# Patient Record
Sex: FEMALE | Race: WHITE | NOT HISPANIC OR LATINO | URBAN - NONMETROPOLITAN AREA
[De-identification: names, ages, dates, MRNs, and addresses within clinical notes are randomized per-mention and may not be internally consistent; named-entity substitution may affect disease eponyms.]

---

## 2021-01-12 ENCOUNTER — OUTSIDE FACILITY SERVICE (OUTPATIENT)
Dept: CARDIOLOGY | Facility: CLINIC | Age: 61
End: 2021-01-12

## 2021-01-12 PROCEDURE — 93010 ELECTROCARDIOGRAM REPORT: CPT | Performed by: INTERNAL MEDICINE

## 2022-03-30 ENCOUNTER — HOSPITAL ENCOUNTER (OUTPATIENT)
Dept: PREADMISSION TESTING | Age: 62
Discharge: HOME | End: 2022-04-03
Payer: OTHER GOVERNMENT

## 2022-03-30 ENCOUNTER — HOSPITAL ENCOUNTER (OUTPATIENT)
Dept: GENERAL RADIOLOGY | Age: 62
Discharge: HOME | End: 2022-03-30
Payer: OTHER GOVERNMENT

## 2022-03-30 VITALS — HEIGHT: 65 IN | BODY MASS INDEX: 45.98 KG/M2 | WEIGHT: 276 LBS

## 2022-03-30 LAB
ABO/RH: NORMAL
ALBUMIN SERPL-MCNC: 4 G/DL (ref 3.5–5.2)
ALP BLD-CCNC: 122 U/L (ref 35–104)
ALT SERPL-CCNC: 17 U/L (ref 5–33)
ANION GAP SERPL CALCULATED.3IONS-SCNC: 12 MMOL/L (ref 7–19)
ANTIBODY SCREEN: NORMAL
APTT: 26.6 SEC (ref 26–36.2)
AST SERPL-CCNC: 17 U/L (ref 5–32)
BACTERIA: NEGATIVE /HPF
BASOPHILS ABSOLUTE: 0.1 K/UL (ref 0–0.2)
BASOPHILS RELATIVE PERCENT: 1.1 % (ref 0–1)
BILIRUB SERPL-MCNC: 0.3 MG/DL (ref 0.2–1.2)
BILIRUBIN URINE: NEGATIVE
BLOOD, URINE: NEGATIVE
BUN BLDV-MCNC: 17 MG/DL (ref 8–23)
CALCIUM SERPL-MCNC: 9.3 MG/DL (ref 8.8–10.2)
CHLORIDE BLD-SCNC: 101 MMOL/L (ref 98–111)
CLARITY: CLEAR
CO2: 27 MMOL/L (ref 22–29)
COLOR: YELLOW
CREAT SERPL-MCNC: 0.8 MG/DL (ref 0.5–0.9)
CRYSTALS, UA: ABNORMAL /HPF
EOSINOPHILS ABSOLUTE: 0.2 K/UL (ref 0–0.6)
EOSINOPHILS RELATIVE PERCENT: 1.7 % (ref 0–5)
EPITHELIAL CELLS, UA: 1 /HPF (ref 0–5)
GFR AFRICAN AMERICAN: >59
GFR NON-AFRICAN AMERICAN: >60
GLUCOSE BLD-MCNC: 84 MG/DL (ref 74–109)
GLUCOSE URINE: NEGATIVE MG/DL
HCT VFR BLD CALC: 42 % (ref 37–47)
HEMOGLOBIN: 12.9 G/DL (ref 12–16)
HYALINE CASTS: 2 /HPF (ref 0–8)
IMMATURE GRANULOCYTES #: 0 K/UL
INR BLD: 0.93 (ref 0.88–1.18)
KETONES, URINE: NEGATIVE MG/DL
LEUKOCYTE ESTERASE, URINE: ABNORMAL
LYMPHOCYTES ABSOLUTE: 2.3 K/UL (ref 1.1–4.5)
LYMPHOCYTES RELATIVE PERCENT: 24.2 % (ref 20–40)
MCH RBC QN AUTO: 26.9 PG (ref 27–31)
MCHC RBC AUTO-ENTMCNC: 30.7 G/DL (ref 33–37)
MCV RBC AUTO: 87.7 FL (ref 81–99)
MONOCYTES ABSOLUTE: 0.6 K/UL (ref 0–0.9)
MONOCYTES RELATIVE PERCENT: 6.1 % (ref 0–10)
MRSA SCREEN RT-PCR: NOT DETECTED
NEUTROPHILS ABSOLUTE: 6.3 K/UL (ref 1.5–7.5)
NEUTROPHILS RELATIVE PERCENT: 66.5 % (ref 50–65)
NITRITE, URINE: NEGATIVE
PDW BLD-RTO: 13.1 % (ref 11.5–14.5)
PH UA: 6.5 (ref 5–8)
PLATELET # BLD: 395 K/UL (ref 130–400)
PMV BLD AUTO: 9.5 FL (ref 9.4–12.3)
POTASSIUM SERPL-SCNC: 4 MMOL/L (ref 3.5–5)
PROTEIN UA: NEGATIVE MG/DL
PROTHROMBIN TIME: 12.7 SEC (ref 12–14.6)
RBC # BLD: 4.79 M/UL (ref 4.2–5.4)
RBC UA: 4 /HPF (ref 0–4)
SODIUM BLD-SCNC: 140 MMOL/L (ref 136–145)
SPECIFIC GRAVITY UA: 1.02 (ref 1–1.03)
TOTAL PROTEIN: 6.8 G/DL (ref 6.6–8.7)
UROBILINOGEN, URINE: 1 E.U./DL
WBC # BLD: 9.4 K/UL (ref 4.8–10.8)
WBC UA: 1 /HPF (ref 0–5)

## 2022-03-30 PROCEDURE — 71046 X-RAY EXAM CHEST 2 VIEWS: CPT

## 2022-03-30 PROCEDURE — 85025 COMPLETE CBC W/AUTO DIFF WBC: CPT

## 2022-03-30 PROCEDURE — 87641 MR-STAPH DNA AMP PROBE: CPT

## 2022-03-30 PROCEDURE — 80053 COMPREHEN METABOLIC PANEL: CPT

## 2022-03-30 PROCEDURE — 86901 BLOOD TYPING SEROLOGIC RH(D): CPT

## 2022-03-30 PROCEDURE — 86850 RBC ANTIBODY SCREEN: CPT

## 2022-03-30 PROCEDURE — 86900 BLOOD TYPING SEROLOGIC ABO: CPT

## 2022-03-30 PROCEDURE — 81001 URINALYSIS AUTO W/SCOPE: CPT

## 2022-03-30 PROCEDURE — 85610 PROTHROMBIN TIME: CPT

## 2022-03-30 PROCEDURE — 85730 THROMBOPLASTIN TIME PARTIAL: CPT

## 2022-03-30 RX ORDER — DEXAMETHASONE SODIUM PHOSPHATE 10 MG/ML
10 INJECTION, SOLUTION INTRAMUSCULAR; INTRAVENOUS ONCE
Status: CANCELLED | OUTPATIENT
Start: 2022-04-13

## 2022-03-30 RX ORDER — ERGOCALCIFEROL 1.25 MG/1
50000 CAPSULE ORAL WEEKLY
COMMUNITY

## 2022-03-30 RX ORDER — DICLOFENAC POTASSIUM 50 MG/1
100 TABLET, FILM COATED ORAL DAILY
Status: ON HOLD | COMMUNITY
End: 2022-04-15 | Stop reason: HOSPADM

## 2022-03-30 RX ORDER — TRAMADOL HYDROCHLORIDE 50 MG/1
100 TABLET ORAL EVERY 6 HOURS PRN
Status: ON HOLD | COMMUNITY
End: 2022-04-15 | Stop reason: HOSPADM

## 2022-03-30 RX ORDER — OLMESARTAN MEDOXOMIL, AMLODIPINE AND HYDROCHLOROTHIAZIDE TABLET 20/5/12.5 MG 20; 5; 12.5 MG/1; MG/1; MG/1
1 TABLET ORAL DAILY
COMMUNITY

## 2022-03-30 RX ORDER — ACETAMINOPHEN 500 MG
1000 TABLET ORAL ONCE
Status: CANCELLED | OUTPATIENT
Start: 2022-04-13

## 2022-03-30 RX ORDER — PREGABALIN 75 MG/1
75 CAPSULE ORAL ONCE
Status: CANCELLED | OUTPATIENT
Start: 2022-04-13

## 2022-03-30 RX ORDER — ZOLPIDEM TARTRATE 10 MG/1
10 TABLET ORAL NIGHTLY PRN
COMMUNITY

## 2022-03-30 RX ORDER — ASPIRIN 81 MG/1
81 TABLET ORAL DAILY
Status: ON HOLD | COMMUNITY
End: 2022-04-15 | Stop reason: HOSPADM

## 2022-03-30 RX ORDER — ESCITALOPRAM OXALATE 20 MG/1
20 TABLET ORAL DAILY
COMMUNITY

## 2022-03-30 RX ORDER — OXYCODONE HCL 10 MG/1
10 TABLET, FILM COATED, EXTENDED RELEASE ORAL ONCE
Status: CANCELLED | OUTPATIENT
Start: 2022-04-13

## 2022-03-30 RX ORDER — LEVOTHYROXINE SODIUM 0.12 MG/1
125 TABLET ORAL DAILY
COMMUNITY

## 2022-04-13 ENCOUNTER — ANESTHESIA (OUTPATIENT)
Dept: OPERATING ROOM | Age: 62
End: 2022-04-13
Payer: OTHER GOVERNMENT

## 2022-04-13 ENCOUNTER — ANESTHESIA EVENT (OUTPATIENT)
Dept: OPERATING ROOM | Age: 62
End: 2022-04-13
Payer: OTHER GOVERNMENT

## 2022-04-13 ENCOUNTER — APPOINTMENT (OUTPATIENT)
Dept: GENERAL RADIOLOGY | Age: 62
End: 2022-04-13
Attending: ORTHOPAEDIC SURGERY
Payer: OTHER GOVERNMENT

## 2022-04-13 ENCOUNTER — HOSPITAL ENCOUNTER (OUTPATIENT)
Age: 62
Setting detail: OBSERVATION
Discharge: HOME HEALTH | End: 2022-04-15
Attending: ORTHOPAEDIC SURGERY | Admitting: ORTHOPAEDIC SURGERY
Payer: OTHER GOVERNMENT

## 2022-04-13 VITALS — TEMPERATURE: 98 F | SYSTOLIC BLOOD PRESSURE: 110 MMHG | DIASTOLIC BLOOD PRESSURE: 54 MMHG | OXYGEN SATURATION: 96 %

## 2022-04-13 DIAGNOSIS — M17.10 ARTHRITIS OF KNEE: Primary | ICD-10-CM

## 2022-04-13 PROCEDURE — 3600000005 HC SURGERY LEVEL 5 BASE: Performed by: ORTHOPAEDIC SURGERY

## 2022-04-13 PROCEDURE — 2580000003 HC RX 258: Performed by: ORTHOPAEDIC SURGERY

## 2022-04-13 PROCEDURE — 73560 X-RAY EXAM OF KNEE 1 OR 2: CPT

## 2022-04-13 PROCEDURE — 6360000002 HC RX W HCPCS: Performed by: ORTHOPAEDIC SURGERY

## 2022-04-13 PROCEDURE — 7100000001 HC PACU RECOVERY - ADDTL 15 MIN: Performed by: ORTHOPAEDIC SURGERY

## 2022-04-13 PROCEDURE — 2500000003 HC RX 250 WO HCPCS: Performed by: NURSE ANESTHETIST, CERTIFIED REGISTERED

## 2022-04-13 PROCEDURE — 6360000002 HC RX W HCPCS: Performed by: ANESTHESIOLOGY

## 2022-04-13 PROCEDURE — 3600000015 HC SURGERY LEVEL 5 ADDTL 15MIN: Performed by: ORTHOPAEDIC SURGERY

## 2022-04-13 PROCEDURE — 6360000002 HC RX W HCPCS: Performed by: NURSE ANESTHETIST, CERTIFIED REGISTERED

## 2022-04-13 PROCEDURE — G0378 HOSPITAL OBSERVATION PER HR: HCPCS

## 2022-04-13 PROCEDURE — 2500000003 HC RX 250 WO HCPCS: Performed by: ORTHOPAEDIC SURGERY

## 2022-04-13 PROCEDURE — 6370000000 HC RX 637 (ALT 250 FOR IP): Performed by: ORTHOPAEDIC SURGERY

## 2022-04-13 PROCEDURE — 3700000001 HC ADD 15 MINUTES (ANESTHESIA): Performed by: ORTHOPAEDIC SURGERY

## 2022-04-13 PROCEDURE — C1776 JOINT DEVICE (IMPLANTABLE): HCPCS | Performed by: ORTHOPAEDIC SURGERY

## 2022-04-13 PROCEDURE — 3700000000 HC ANESTHESIA ATTENDED CARE: Performed by: ORTHOPAEDIC SURGERY

## 2022-04-13 PROCEDURE — 64447 NJX AA&/STRD FEMORAL NRV IMG: CPT | Performed by: NURSE ANESTHETIST, CERTIFIED REGISTERED

## 2022-04-13 PROCEDURE — 7100000000 HC PACU RECOVERY - FIRST 15 MIN: Performed by: ORTHOPAEDIC SURGERY

## 2022-04-13 PROCEDURE — C9290 INJ, BUPIVACAINE LIPOSOME: HCPCS | Performed by: ORTHOPAEDIC SURGERY

## 2022-04-13 PROCEDURE — 2709999900 HC NON-CHARGEABLE SUPPLY: Performed by: ORTHOPAEDIC SURGERY

## 2022-04-13 PROCEDURE — A4217 STERILE WATER/SALINE, 500 ML: HCPCS | Performed by: ORTHOPAEDIC SURGERY

## 2022-04-13 DEVICE — PSN FEM CR POR CCR NRW SZ8 L: Type: IMPLANTABLE DEVICE | Site: KNEE | Status: FUNCTIONAL

## 2022-04-13 DEVICE — IMPLANTABLE DEVICE
Type: IMPLANTABLE DEVICE | Site: KNEE | Status: FUNCTIONAL
Brand: PERSONA® VIVACIT-E®

## 2022-04-13 DEVICE — PSN TIB POR 2 PEG SZ E L: Type: IMPLANTABLE DEVICE | Site: KNEE | Status: FUNCTIONAL

## 2022-04-13 RX ORDER — HYDROMORPHONE HYDROCHLORIDE 1 MG/ML
1 INJECTION, SOLUTION INTRAMUSCULAR; INTRAVENOUS; SUBCUTANEOUS
Status: DISCONTINUED | OUTPATIENT
Start: 2022-04-13 | End: 2022-04-15 | Stop reason: HOSPADM

## 2022-04-13 RX ORDER — PREGABALIN 75 MG/1
75 CAPSULE ORAL ONCE
Status: COMPLETED | OUTPATIENT
Start: 2022-04-13 | End: 2022-04-13

## 2022-04-13 RX ORDER — ZOLPIDEM TARTRATE 5 MG/1
10 TABLET ORAL NIGHTLY PRN
Status: DISCONTINUED | OUTPATIENT
Start: 2022-04-13 | End: 2022-04-15 | Stop reason: HOSPADM

## 2022-04-13 RX ORDER — ROPIVACAINE HYDROCHLORIDE 5 MG/ML
INJECTION, SOLUTION EPIDURAL; INFILTRATION; PERINEURAL
Status: COMPLETED | OUTPATIENT
Start: 2022-04-13 | End: 2022-04-13

## 2022-04-13 RX ORDER — METOCLOPRAMIDE HYDROCHLORIDE 5 MG/ML
10 INJECTION INTRAMUSCULAR; INTRAVENOUS
Status: DISCONTINUED | OUTPATIENT
Start: 2022-04-13 | End: 2022-04-13 | Stop reason: HOSPADM

## 2022-04-13 RX ORDER — ERGOCALCIFEROL 1.25 MG/1
50000 CAPSULE ORAL WEEKLY
Status: DISCONTINUED | OUTPATIENT
Start: 2022-04-14 | End: 2022-04-15 | Stop reason: HOSPADM

## 2022-04-13 RX ORDER — DEXAMETHASONE SODIUM PHOSPHATE 10 MG/ML
10 INJECTION, SOLUTION INTRAMUSCULAR; INTRAVENOUS ONCE
Status: DISCONTINUED | OUTPATIENT
Start: 2022-04-13 | End: 2022-04-13 | Stop reason: HOSPADM

## 2022-04-13 RX ORDER — MIDAZOLAM HYDROCHLORIDE 1 MG/ML
2 INJECTION INTRAMUSCULAR; INTRAVENOUS
Status: COMPLETED | OUTPATIENT
Start: 2022-04-13 | End: 2022-04-13

## 2022-04-13 RX ORDER — DEXAMETHASONE SODIUM PHOSPHATE 4 MG/ML
INJECTION, SOLUTION INTRA-ARTICULAR; INTRALESIONAL; INTRAMUSCULAR; INTRAVENOUS; SOFT TISSUE PRN
Status: DISCONTINUED | OUTPATIENT
Start: 2022-04-13 | End: 2022-04-13 | Stop reason: SDUPTHER

## 2022-04-13 RX ORDER — TRAMADOL HYDROCHLORIDE 50 MG/1
50 TABLET ORAL EVERY 6 HOURS
Status: DISCONTINUED | OUTPATIENT
Start: 2022-04-13 | End: 2022-04-15 | Stop reason: HOSPADM

## 2022-04-13 RX ORDER — 0.9 % SODIUM CHLORIDE 0.9 %
500 INTRAVENOUS SOLUTION INTRAVENOUS PRN
Status: DISCONTINUED | OUTPATIENT
Start: 2022-04-13 | End: 2022-04-15 | Stop reason: HOSPADM

## 2022-04-13 RX ORDER — DIPHENHYDRAMINE HCL 25 MG
25 TABLET ORAL EVERY 6 HOURS PRN
Status: DISCONTINUED | OUTPATIENT
Start: 2022-04-13 | End: 2022-04-15 | Stop reason: HOSPADM

## 2022-04-13 RX ORDER — SODIUM CHLORIDE 9 MG/ML
INJECTION, SOLUTION INTRAVENOUS CONTINUOUS
Status: DISCONTINUED | OUTPATIENT
Start: 2022-04-13 | End: 2022-04-15 | Stop reason: HOSPADM

## 2022-04-13 RX ORDER — SODIUM CHLORIDE 9 MG/ML
25 INJECTION, SOLUTION INTRAVENOUS PRN
Status: DISCONTINUED | OUTPATIENT
Start: 2022-04-13 | End: 2022-04-15 | Stop reason: HOSPADM

## 2022-04-13 RX ORDER — HYDROMORPHONE HYDROCHLORIDE 1 MG/ML
2 INJECTION, SOLUTION INTRAMUSCULAR; INTRAVENOUS; SUBCUTANEOUS
Status: DISCONTINUED | OUTPATIENT
Start: 2022-04-13 | End: 2022-04-15 | Stop reason: HOSPADM

## 2022-04-13 RX ORDER — ONDANSETRON 2 MG/ML
INJECTION INTRAMUSCULAR; INTRAVENOUS PRN
Status: DISCONTINUED | OUTPATIENT
Start: 2022-04-13 | End: 2022-04-13 | Stop reason: SDUPTHER

## 2022-04-13 RX ORDER — ACETAMINOPHEN 500 MG
1000 TABLET ORAL ONCE
Status: COMPLETED | OUTPATIENT
Start: 2022-04-13 | End: 2022-04-13

## 2022-04-13 RX ORDER — PROPOFOL 10 MG/ML
INJECTION, EMULSION INTRAVENOUS CONTINUOUS PRN
Status: DISCONTINUED | OUTPATIENT
Start: 2022-04-13 | End: 2022-04-13 | Stop reason: SDUPTHER

## 2022-04-13 RX ORDER — ESCITALOPRAM OXALATE 10 MG/1
20 TABLET ORAL DAILY
Status: DISCONTINUED | OUTPATIENT
Start: 2022-04-13 | End: 2022-04-15 | Stop reason: HOSPADM

## 2022-04-13 RX ORDER — SODIUM CHLORIDE 0.9 % (FLUSH) 0.9 %
10 SYRINGE (ML) INJECTION PRN
Status: DISCONTINUED | OUTPATIENT
Start: 2022-04-13 | End: 2022-04-15 | Stop reason: HOSPADM

## 2022-04-13 RX ORDER — CLINDAMYCIN PHOSPHATE 900 MG/50ML
900 INJECTION INTRAVENOUS EVERY 8 HOURS
Status: COMPLETED | OUTPATIENT
Start: 2022-04-13 | End: 2022-04-15

## 2022-04-13 RX ORDER — HYDROMORPHONE HYDROCHLORIDE 1 MG/ML
0.5 INJECTION, SOLUTION INTRAMUSCULAR; INTRAVENOUS; SUBCUTANEOUS
Status: DISCONTINUED | OUTPATIENT
Start: 2022-04-13 | End: 2022-04-15 | Stop reason: HOSPADM

## 2022-04-13 RX ORDER — SODIUM CHLORIDE, SODIUM LACTATE, POTASSIUM CHLORIDE, CALCIUM CHLORIDE 600; 310; 30; 20 MG/100ML; MG/100ML; MG/100ML; MG/100ML
INJECTION, SOLUTION INTRAVENOUS CONTINUOUS
Status: DISCONTINUED | OUTPATIENT
Start: 2022-04-13 | End: 2022-04-13

## 2022-04-13 RX ORDER — MORPHINE SULFATE 4 MG/ML
4 INJECTION, SOLUTION INTRAMUSCULAR; INTRAVENOUS EVERY 5 MIN PRN
Status: DISCONTINUED | OUTPATIENT
Start: 2022-04-13 | End: 2022-04-13 | Stop reason: HOSPADM

## 2022-04-13 RX ORDER — MEPERIDINE HYDROCHLORIDE 25 MG/ML
12.5 INJECTION INTRAMUSCULAR; INTRAVENOUS; SUBCUTANEOUS EVERY 5 MIN PRN
Status: DISCONTINUED | OUTPATIENT
Start: 2022-04-13 | End: 2022-04-13 | Stop reason: HOSPADM

## 2022-04-13 RX ORDER — ONDANSETRON 4 MG/1
4 TABLET, ORALLY DISINTEGRATING ORAL EVERY 8 HOURS PRN
Status: DISCONTINUED | OUTPATIENT
Start: 2022-04-13 | End: 2022-04-15 | Stop reason: HOSPADM

## 2022-04-13 RX ORDER — ONDANSETRON 2 MG/ML
4 INJECTION INTRAMUSCULAR; INTRAVENOUS EVERY 6 HOURS PRN
Status: DISCONTINUED | OUTPATIENT
Start: 2022-04-13 | End: 2022-04-15 | Stop reason: HOSPADM

## 2022-04-13 RX ORDER — ROPIVACAINE HYDROCHLORIDE 5 MG/ML
INJECTION, SOLUTION EPIDURAL; INFILTRATION; PERINEURAL
Status: DISPENSED
Start: 2022-04-13 | End: 2022-04-13

## 2022-04-13 RX ORDER — OXYCODONE HYDROCHLORIDE 5 MG/1
10 TABLET ORAL EVERY 4 HOURS PRN
Status: DISCONTINUED | OUTPATIENT
Start: 2022-04-13 | End: 2022-04-15 | Stop reason: HOSPADM

## 2022-04-13 RX ORDER — MORPHINE SULFATE 2 MG/ML
2 INJECTION, SOLUTION INTRAMUSCULAR; INTRAVENOUS EVERY 5 MIN PRN
Status: DISCONTINUED | OUTPATIENT
Start: 2022-04-13 | End: 2022-04-13 | Stop reason: HOSPADM

## 2022-04-13 RX ORDER — OXYCODONE HYDROCHLORIDE 5 MG/1
20 TABLET ORAL EVERY 4 HOURS PRN
Status: DISCONTINUED | OUTPATIENT
Start: 2022-04-13 | End: 2022-04-15 | Stop reason: HOSPADM

## 2022-04-13 RX ORDER — FAMOTIDINE 20 MG/1
20 TABLET, FILM COATED ORAL ONCE
Status: DISCONTINUED | OUTPATIENT
Start: 2022-04-13 | End: 2022-04-13 | Stop reason: HOSPADM

## 2022-04-13 RX ORDER — LEVOTHYROXINE SODIUM 0.12 MG/1
125 TABLET ORAL DAILY
Status: DISCONTINUED | OUTPATIENT
Start: 2022-04-14 | End: 2022-04-15 | Stop reason: HOSPADM

## 2022-04-13 RX ORDER — OXYCODONE HCL 10 MG/1
10 TABLET, FILM COATED, EXTENDED RELEASE ORAL EVERY 12 HOURS SCHEDULED
Status: DISCONTINUED | OUTPATIENT
Start: 2022-04-13 | End: 2022-04-15 | Stop reason: HOSPADM

## 2022-04-13 RX ORDER — SCOLOPAMINE TRANSDERMAL SYSTEM 1 MG/1
1 PATCH, EXTENDED RELEASE TRANSDERMAL
Status: DISCONTINUED | OUTPATIENT
Start: 2022-04-13 | End: 2022-04-13 | Stop reason: HOSPADM

## 2022-04-13 RX ORDER — DIPHENHYDRAMINE HYDROCHLORIDE 50 MG/ML
12.5 INJECTION INTRAMUSCULAR; INTRAVENOUS
Status: DISCONTINUED | OUTPATIENT
Start: 2022-04-13 | End: 2022-04-13 | Stop reason: HOSPADM

## 2022-04-13 RX ORDER — TRANEXAMIC ACID 100 MG/ML
INJECTION, SOLUTION INTRAVENOUS PRN
Status: DISCONTINUED | OUTPATIENT
Start: 2022-04-13 | End: 2022-04-13 | Stop reason: SDUPTHER

## 2022-04-13 RX ORDER — OXYCODONE HCL 10 MG/1
10 TABLET, FILM COATED, EXTENDED RELEASE ORAL ONCE
Status: COMPLETED | OUTPATIENT
Start: 2022-04-13 | End: 2022-04-13

## 2022-04-13 RX ORDER — SODIUM CHLORIDE 0.9 % (FLUSH) 0.9 %
10 SYRINGE (ML) INJECTION EVERY 12 HOURS SCHEDULED
Status: DISCONTINUED | OUTPATIENT
Start: 2022-04-13 | End: 2022-04-15 | Stop reason: HOSPADM

## 2022-04-13 RX ORDER — OXYCODONE HYDROCHLORIDE 5 MG/1
5 TABLET ORAL EVERY 4 HOURS PRN
Status: DISCONTINUED | OUTPATIENT
Start: 2022-04-13 | End: 2022-04-15 | Stop reason: HOSPADM

## 2022-04-13 RX ORDER — BUPIVACAINE HYDROCHLORIDE 7.5 MG/ML
INJECTION, SOLUTION INTRASPINAL PRN
Status: DISCONTINUED | OUTPATIENT
Start: 2022-04-13 | End: 2022-04-13 | Stop reason: SDUPTHER

## 2022-04-13 RX ORDER — ACETAMINOPHEN 325 MG/1
650 TABLET ORAL EVERY 6 HOURS
Status: DISCONTINUED | OUTPATIENT
Start: 2022-04-13 | End: 2022-04-15 | Stop reason: HOSPADM

## 2022-04-13 RX ORDER — EPHEDRINE SULFATE 50 MG/ML
INJECTION, SOLUTION INTRAVENOUS PRN
Status: DISCONTINUED | OUTPATIENT
Start: 2022-04-13 | End: 2022-04-13 | Stop reason: SDUPTHER

## 2022-04-13 RX ORDER — LIDOCAINE HYDROCHLORIDE 10 MG/ML
1 INJECTION, SOLUTION EPIDURAL; INFILTRATION; INTRACAUDAL; PERINEURAL
Status: DISCONTINUED | OUTPATIENT
Start: 2022-04-13 | End: 2022-04-13 | Stop reason: HOSPADM

## 2022-04-13 RX ADMIN — HYDROMORPHONE HYDROCHLORIDE 2 MG: 1 INJECTION, SOLUTION INTRAMUSCULAR; INTRAVENOUS; SUBCUTANEOUS at 14:59

## 2022-04-13 RX ADMIN — CLINDAMYCIN IN 5 PERCENT DEXTROSE 900 MG: 18 INJECTION, SOLUTION INTRAVENOUS at 22:21

## 2022-04-13 RX ADMIN — PHENYLEPHRINE HYDROCHLORIDE 100 MCG: 10 INJECTION INTRAVENOUS at 10:46

## 2022-04-13 RX ADMIN — EPHEDRINE SULFATE 10 MG: 50 INJECTION INTRAMUSCULAR; INTRAVENOUS; SUBCUTANEOUS at 10:28

## 2022-04-13 RX ADMIN — SODIUM CHLORIDE, SODIUM LACTATE, POTASSIUM CHLORIDE, AND CALCIUM CHLORIDE: 600; 310; 30; 20 INJECTION, SOLUTION INTRAVENOUS at 08:17

## 2022-04-13 RX ADMIN — ZOLPIDEM TARTRATE 10 MG: 5 TABLET ORAL at 20:31

## 2022-04-13 RX ADMIN — Medication 1000 MG: at 10:41

## 2022-04-13 RX ADMIN — OXYCODONE HYDROCHLORIDE 10 MG: 10 TABLET, FILM COATED, EXTENDED RELEASE ORAL at 20:28

## 2022-04-13 RX ADMIN — EPHEDRINE SULFATE 10 MG: 50 INJECTION INTRAMUSCULAR; INTRAVENOUS; SUBCUTANEOUS at 10:44

## 2022-04-13 RX ADMIN — MIDAZOLAM 2 MG: 1 INJECTION, SOLUTION INTRAMUSCULAR; INTRAVENOUS at 09:28

## 2022-04-13 RX ADMIN — TRANEXAMIC ACID 1000 MG: 1 INJECTION, SOLUTION INTRAVENOUS at 11:26

## 2022-04-13 RX ADMIN — EPHEDRINE SULFATE 10 MG: 50 INJECTION INTRAMUSCULAR; INTRAVENOUS; SUBCUTANEOUS at 11:17

## 2022-04-13 RX ADMIN — HYDROMORPHONE HYDROCHLORIDE 1 MG: 1 INJECTION, SOLUTION INTRAMUSCULAR; INTRAVENOUS; SUBCUTANEOUS at 22:19

## 2022-04-13 RX ADMIN — CLINDAMYCIN IN 5 PERCENT DEXTROSE 900 MG: 18 INJECTION, SOLUTION INTRAVENOUS at 15:17

## 2022-04-13 RX ADMIN — DEXAMETHASONE SODIUM PHOSPHATE 4 MG: 4 INJECTION, SOLUTION INTRAMUSCULAR; INTRAVENOUS at 10:29

## 2022-04-13 RX ADMIN — BISACODYL 5 MG: 5 TABLET, COATED ORAL at 15:01

## 2022-04-13 RX ADMIN — TRAMADOL HYDROCHLORIDE 50 MG: 50 TABLET, COATED ORAL at 15:01

## 2022-04-13 RX ADMIN — TRANEXAMIC ACID 1000 MG: 1 INJECTION, SOLUTION INTRAVENOUS at 10:20

## 2022-04-13 RX ADMIN — ONDANSETRON 4 MG: 2 INJECTION INTRAMUSCULAR; INTRAVENOUS at 10:05

## 2022-04-13 RX ADMIN — OXYCODONE 10 MG: 5 TABLET ORAL at 21:37

## 2022-04-13 RX ADMIN — PHENYLEPHRINE HYDROCHLORIDE 100 MCG: 10 INJECTION INTRAVENOUS at 11:10

## 2022-04-13 RX ADMIN — ACETAMINOPHEN 1000 MG: 500 TABLET ORAL at 08:16

## 2022-04-13 RX ADMIN — PREGABALIN 75 MG: 75 CAPSULE ORAL at 08:16

## 2022-04-13 RX ADMIN — ESCITALOPRAM OXALATE 20 MG: 10 TABLET ORAL at 15:01

## 2022-04-13 RX ADMIN — SODIUM CHLORIDE, SODIUM LACTATE, POTASSIUM CHLORIDE, AND CALCIUM CHLORIDE: 600; 310; 30; 20 INJECTION, SOLUTION INTRAVENOUS at 10:42

## 2022-04-13 RX ADMIN — EPHEDRINE SULFATE 10 MG: 50 INJECTION INTRAMUSCULAR; INTRAVENOUS; SUBCUTANEOUS at 10:33

## 2022-04-13 RX ADMIN — PROPOFOL 140 MCG/KG/MIN: 10 INJECTION, EMULSION INTRAVENOUS at 10:15

## 2022-04-13 RX ADMIN — ACETAMINOPHEN 650 MG: 325 TABLET ORAL at 15:01

## 2022-04-13 RX ADMIN — BUPIVACAINE HYDROCHLORIDE IN DEXTROSE 1.8 ML: 7.5 INJECTION, SOLUTION SUBARACHNOID at 10:11

## 2022-04-13 RX ADMIN — Medication 3000 MG: at 20:53

## 2022-04-13 RX ADMIN — OXYCODONE HYDROCHLORIDE 10 MG: 10 TABLET, FILM COATED, EXTENDED RELEASE ORAL at 08:16

## 2022-04-13 RX ADMIN — Medication 2000 MG: at 10:17

## 2022-04-13 RX ADMIN — ACETAMINOPHEN 650 MG: 325 TABLET ORAL at 20:28

## 2022-04-13 RX ADMIN — ROPIVACAINE HYDROCHLORIDE 20 ML: 5 INJECTION, SOLUTION EPIDURAL; INFILTRATION; PERINEURAL at 09:35

## 2022-04-13 RX ADMIN — SODIUM CHLORIDE: 9 INJECTION, SOLUTION INTRAVENOUS at 15:04

## 2022-04-13 RX ADMIN — RIVAROXABAN 10 MG: 10 TABLET, FILM COATED ORAL at 20:50

## 2022-04-13 RX ADMIN — TRAMADOL HYDROCHLORIDE 50 MG: 50 TABLET, COATED ORAL at 20:28

## 2022-04-13 ASSESSMENT — PAIN SCALES - GENERAL
PAINLEVEL_OUTOF10: 8
PAINLEVEL_OUTOF10: 0
PAINLEVEL_OUTOF10: 6
PAINLEVEL_OUTOF10: 10
PAINLEVEL_OUTOF10: 5

## 2022-04-13 ASSESSMENT — PAIN - FUNCTIONAL ASSESSMENT: PAIN_FUNCTIONAL_ASSESSMENT: 0-10

## 2022-04-13 ASSESSMENT — ENCOUNTER SYMPTOMS: SHORTNESS OF BREATH: 0

## 2022-04-13 ASSESSMENT — LIFESTYLE VARIABLES: SMOKING_STATUS: 0

## 2022-04-13 NOTE — ANESTHESIA PROCEDURE NOTES
Spinal Block    Patient location during procedure: OR  Start time: 4/13/2022 10:11 AM  Reason for block: primary anesthetic  Staffing  Performed: resident/CRNA   Resident/CRNA: MICHELLE Beth CRNA  Preanesthetic Checklist  Completed: patient identified, IV checked, site marked, risks and benefits discussed, surgical consent, monitors and equipment checked, pre-op evaluation, timeout performed, anesthesia consent given, oxygen available and patient being monitored  Spinal Block  Patient position: sitting  Prep: Betadine  Patient monitoring: continuous pulse ox and frequent blood pressure checks  Approach: midline  Location: L3/L4  Provider prep: mask and sterile gloves  Local infiltration: lidocaine  Agent: bupivacaine  Dose: 1.8  Dose: 1.8  Needle  Needle type: Pencan   Needle gauge: 24 G  Needle length: 4 in  Lot number: [id number]  Expiration date: 11/1/2023  Assessment  Sensory level: T6  Events: cerebrospinal fluid  Swirl obtained: Yes  CSF: clear  Attempts: 1  Additional Notes  Chart reviewed. Risks and benefits discussed with patient. Sterile prep and drape and sterility maintained throughout procedure. L3-L4 space identified. Lidocaine 1% skin wheal. Pencan 24 gauge needle used. Positive CSF noted. No paresthesia or Heme noted. Positive swirl at beginning of injection and midway. Appropriate sensory level achieved and confirmed by surgeon prior to incision being made.

## 2022-04-13 NOTE — H&P
I have reviewed the history and physical for planned procedure. I have re-examined the patient and there are no changes to the history and physical unless noted below.     Electronically signed by Tressa Palafox MD on 4/13/2022 at 8:59 AM

## 2022-04-13 NOTE — ANESTHESIA PRE PROCEDURE
Department of Anesthesiology  Preprocedure Note       Name:  Rashi Alfaro   Age:  59 y.o.  :  1960                                          MRN:  [id number]         Date:  2022      Surgeon: Janelle Wan):  Juve Posada MD    Procedure: Procedure(s):  LEFT KNEE TOTAL ARTHROPLASTY    Medications prior to admission:   Prior to Admission medications    Medication Sig Start Date End Date Taking? Authorizing Provider   zolpidem (AMBIEN) 10 MG tablet Take 10 mg by mouth nightly as needed for Sleep. Historical Provider, MD   vitamin D (ERGOCALCIFEROL) 1.25 MG (01834 UT) CAPS capsule Take 50,000 Units by mouth once a week Indications: Thursday    Historical Provider, MD   olmesartan-amLODIPine-HCTZ 20-5-12.5 MG TABS Take 1 tablet by mouth daily    Historical Provider, MD   escitalopram (LEXAPRO) 20 MG tablet Take 20 mg by mouth daily    Historical Provider, MD   levothyroxine (SYNTHROID) 125 MCG tablet Take 125 mcg by mouth Daily    Historical Provider, MD   diclofenac (CATAFLAM) 50 MG tablet Take 100 mg by mouth daily    Historical Provider, MD   traMADol (ULTRAM) 50 MG tablet Take 100 mg by mouth every 6 hours as needed for Pain.     Historical Provider, MD   aspirin 81 MG EC tablet Take 81 mg by mouth daily    Historical Provider, MD       Current medications:    Current Facility-Administered Medications   Medication Dose Route Frequency Provider Last Rate Last Admin    acetaminophen (TYLENOL) tablet 1,000 mg  1,000 mg Oral Once Juve Posada MD        ceFAZolin (ANCEF) 2,000 mg in sterile water 20 mL IV syringe  2,000 mg IntraVENous Once Juve Posada MD        dexamethasone (PF) (DECADRON) injection 10 mg  10 mg IntraVENous Once Juve Posada MD        oxyCODONE (OXYCONTIN) extended release tablet 10 mg  10 mg Oral Once Juve Posada MD        pregabalin (LYRICA) capsule 75 mg  75 mg Oral Once Juve Posada MD        lactated ringers infusion   IntraVENous Continuous Juve Posada MD Allergies: Allergies   Allergen Reactions    Sulfa Antibiotics Other (See Comments)     Blisters, chills       Problem List:  There is no problem list on file for this patient. Past Medical History:        Diagnosis Date    Arthritis     Hypertension     PONV (postoperative nausea and vomiting)     Thyroid disease        Past Surgical History:        Procedure Laterality Date    CATARACT REMOVAL Bilateral     CHOLECYSTECTOMY      PLANTAR FASCIA SURGERY Bilateral     THYROIDECTOMY N/A     partial    TUBAL LIGATION         Social History:    Social History     Tobacco Use    Smoking status: Never Smoker    Smokeless tobacco: Never Used   Substance Use Topics    Alcohol use: Not on file                                Counseling given: Not Answered      Vital Signs (Current):   Vitals:    04/13/22 0809   BP: (!) 142/76   Pulse: 71   Resp: 16   Temp: 97.3 °F (36.3 °C)   TempSrc: Tympanic   SpO2: 97%   Weight: 276 lb (125.2 kg)   Height: 5' 5\" (1.651 m)                                              BP Readings from Last 3 Encounters:   04/13/22 (!) 142/76       NPO Status: Time of last liquid consumption: 2330                        Time of last solid consumption: 1730                        Date of last liquid consumption: 04/12/22                        Date of last solid food consumption: 04/12/22    BMI:   Wt Readings from Last 3 Encounters:   04/13/22 276 lb (125.2 kg)   03/30/22 276 lb (125.2 kg)     Body mass index is 45.93 kg/m².     CBC:   Lab Results   Component Value Date    WBC 9.4 03/30/2022    RBC 4.79 03/30/2022    HGB 12.9 03/30/2022    HCT 42.0 03/30/2022    MCV 87.7 03/30/2022    RDW 13.1 03/30/2022     03/30/2022       CMP:   Lab Results   Component Value Date     03/30/2022    K 4.0 03/30/2022     03/30/2022    CO2 27 03/30/2022    BUN 17 03/30/2022    CREATININE 0.8 03/30/2022    GFRAA >59 03/30/2022    LABGLOM >60 03/30/2022    GLUCOSE 84 03/30/2022    PROT 6.8 03/30/2022    CALCIUM 9.3 03/30/2022    BILITOT 0.3 03/30/2022    ALKPHOS 122 03/30/2022    AST 17 03/30/2022    ALT 17 03/30/2022       POC Tests: No results for input(s): POCGLU, POCNA, POCK, POCCL, POCBUN, POCHEMO, POCHCT in the last 72 hours. Coags:   Lab Results   Component Value Date    PROTIME 12.7 03/30/2022    INR 0.93 03/30/2022    APTT 26.6 03/30/2022       HCG (If Applicable): No results found for: PREGTESTUR, PREGSERUM, HCG, HCGQUANT     ABGs: No results found for: PHART, PO2ART, PGO1ICL, TNH2QRQ, BEART, Z1IUGWOQ     Type & Screen (If Applicable):  No results found for: LABABO, LABRH    Drug/Infectious Status (If Applicable):  No results found for: HIV, HEPCAB    COVID-19 Screening (If Applicable): No results found for: COVID19        Anesthesia Evaluation  Patient summary reviewed and Nursing notes reviewed history of anesthetic complications:   Airway: Mallampati: II  TM distance: >3 FB   Neck ROM: full  Mouth opening: > = 3 FB Dental: normal exam         Pulmonary:Negative Pulmonary ROS and normal exam  breath sounds clear to auscultation      (-) shortness of breath and not a current smoker          Patient did not smoke on day of surgery. Cardiovascular:    (+) hypertension:,     (-) CAD,  angina and  CHF    NYHA Classification: I  ECG reviewed  Rhythm: regular  Rate: normal           Beta Blocker:  Not on Beta Blocker         Neuro/Psych:   Negative Neuro/Psych ROS     (-) seizures, CVA and depression/anxiety            GI/Hepatic/Renal: Neg GI/Hepatic/Renal ROS       (-) hiatal hernia and GERD       Endo/Other: Negative Endo/Other ROS   (+) : arthritis: OA., . Pt had PAT visit. Abdominal:       Abdomen: soft. Vascular: Other Findings:             Anesthesia Plan      spinal and regional     ASA 3     (Adductor Canal block for postop pain management, risk/benefit discussed with patient and accepted.  (risk includes but not limited to; bleeding, infection, risk of drug reaction, nerve damage, death ))  Induction: intravenous. BIS    Anesthetic plan and risks discussed with patient. Plan discussed with CRNA.                   aPvithra Santana MD   4/13/2022

## 2022-04-13 NOTE — BRIEF OP NOTE
Brief Postoperative Note      Patient: Trudi Nguyen  YOB: 1960  MRN: [id number]    Date of Procedure: 4/13/2022    Pre-Op Diagnosis: m17.12    Post-Op Diagnosis: Same       Procedure(s):  LEFT KNEE TOTAL ARTHROPLASTY COMPLEX    Surgeon(s):  Jose Carlos Sung MD    Assistant:  First Assistant: Julia Curtis; Justina Bynum PA-C    Anesthesia: Spinal    Estimated Blood Loss (mL): 251    Complications: None    Specimens:   * No specimens in log *    Implants:  Implant Name Type Inv.  Item Serial No.  Lot No. LRB No. Used Action   PSN FEM CR POR CCR NRW SZ8 L - [id number]  PSN FEM CR POR CCR NRW SZ8 L  [employer] [id number] Left 1 Implanted   PSN TIB POR 2 PEG SZ E L - [id number]  PSN TIB POR 2 PEG SZ E L  [employer]- [id number] Left 1 Implanted   PSN MC VE ASF L 10MM 8-11 EF - [id number]  PSN MC VE ASF L 10MM 8-11 EF  [employer]- [id number] Left 1 Implanted         Drains: * No LDAs found *    Findings: tt: 20 MINUTES    Electronically signed by Jose Carlos Sung MD on 4/13/2022 at 11:40 AM

## 2022-04-13 NOTE — ANESTHESIA PROCEDURE NOTES
Peripheral Block    Patient location during procedure: holding area  Start time: 4/13/2022 9:35 AM  End time: 4/13/2022 9:35 AM  Staffing  Performed: anesthesiologist   Anesthesiologist: Leydi Rowell MD  Preanesthetic Checklist  Completed: patient identified, IV checked, site marked, risks and benefits discussed, surgical consent, monitors and equipment checked, pre-op evaluation, timeout performed, anesthesia consent given, oxygen available and patient being monitored  Peripheral Block  Patient position: supine  Prep: ChloraPrep  Patient monitoring: cardiac monitor, continuous pulse ox, frequent blood pressure checks and IV access  Block type: Femoral  Laterality: left  Injection technique: single-shot  Guidance: ultrasound guided  Adductor canal  Provider prep: mask and sterile gloves  Needle  Needle type: short-bevel   Needle gauge: 20 G  Needle length: 10 cm  Needle localization: ultrasound guidance  Assessment  Injection assessment: negative aspiration for heme, no paresthesia on injection and local visualized surrounding nerve on ultrasound  Paresthesia pain: none  Slow fractionated injection: yes  Hemodynamics: stable  Additional Notes  Needle was introduced in proximal third of thigh in an  china-medial to posterior direction. The needle was visualized \" in- plane\" under ultrasound guidance to access the adductor canal in a sub-sartorial fashion. The femoral artery was avoided and 20 cc of 0.5% ropivacaine  was injected and surrounded the nerve plexus. The plexus appeared anatomically normal, no obvious pathology was noted.  A permanent image was obtained   Medications Administered  Ropivacaine (NAROPIN) injection 0.5%, 20 mL  Reason for block: post-op pain management

## 2022-04-13 NOTE — ANESTHESIA POSTPROCEDURE EVALUATION
Department of Anesthesiology  Postprocedure Note    Patient: Moe Morton  MRN: [id number]  YOB: 1960  Date of evaluation: 4/13/2022  Time:  12:12 PM     Procedure Summary     Date: 04/13/22 Room / Location: [address]    Anesthesia Start: 1004 Anesthesia Stop:     Procedure: LEFT KNEE TOTAL ARTHROPLASTY (Left Knee) Diagnosis: (m17.12)    Surgeons: Florinda Roche MD Responsible Provider: MICHELLE Mooney CRNA    Anesthesia Type: spinal, regional ASA Status: 3          Anesthesia Type: No value filed. Mesfin Phase I: Mesfin Score: 10    Mesfin Phase II:      Last vitals: Reviewed and per EMR flowsheets.        Anesthesia Post Evaluation    Patient location during evaluation: PACU  Patient participation: waiting for patient participation  Level of consciousness: awake  Pain score: 0  Airway patency: patent  Nausea & Vomiting: no nausea and no vomiting  Complications: no  Cardiovascular status: blood pressure returned to baseline  Respiratory status: acceptable  Hydration status: euvolemic  Comments: Report to RN

## 2022-04-13 NOTE — OP NOTE
[facility] SHANEKA [address], [address] [facility]                                OPERATIVE REPORT    PATIENT NAME: Munira Venegas                     :        1960  MED REC NO:   [id number]                              ROOM:       [facility]  ACCOUNT NO:   [de-identified]                           ADMIT DATE: 2022  PROVIDER:     Manuela Grider MD    DATE OF PROCEDURE:  2022    PREOPERATIVE DIAGNOSES:  1. Primary osteoarthritis, left knee. 2.  BMI of _____. POSTOPERATIVE DIAGNOSES:  1. Primary osteoarthritis, left knee. 2.  BMI of _____. PROCEDURE:  Complex primary left total knee replacement. Please note  complexity of the surgery is due to the fact that she had elevated BMI. This added 100% increase in difficulty in exposure, placement of  implants, required use of fluoroscopy for accuracy of component  placement. SURGEON:  Manuela Grider MD    FIRST ASSISTANT:  Michael Bridges PA-C. Please note, he is a critical  assistant in exposure and placement of implants. ANESTHESIA:  Spinal with adductor canal block. EBL:  150 mL. FLUIDS:  1650 mL. URINE OUTPUT:  120 mL. TOURNIQUET TIME:  20 minutes. COMPONENTS USED:  Svitlana Persona knee system, femur size 8 narrow CR TM  press-fit femur, tibia size E TM press-fit tibia, polyethylene size 10  MC polyethylene. INDICATIONS:  This is a 58-year-old lady with severe arthritis of the  left knee, failing conservative care. Because of this, she elected for  the above-stated procedure. OPERATIVE PROCEDURE:  After informed consent, she was given 3 gm of  Ancef, 1 gm of tranexamic acid, underwent adductor canal block and  spinal anesthetic. Tourniquet was placed around the left upper thigh. The right leg was placed in SCD. The left leg was prepped and draped in  the usual sterile fashion. The leg was Esmarched. Tourniquet was  inflated to 250 mmHg. Midline incision was made. Parapatellar approach  was made. Prepatellar fat pad was partially excised. Synovium on the  distal femur was elevated. Intramedullary canal of the femur was  drilled into. Distal resection was made in 3 degrees of valgus taking a  +2 cut. We assessed the resection and did an additional +2 distal  femoral resection. Our final resection medially 11.5 mm, laterally 10  mm. We sized this to a size 8 femur. This was placed in 5 degrees of  external rotation. Anterior, posterior, and chamfer cuts were made. Posteromedial cut 9.75 mm, posterolateral cut 6 mm. The tibia was  exposed. The 7-degree cutting guide was placed in line with the  anterior tibial crest proximally and the middle of the ankle distally,  and tibial resection was made. Menisci excised. Tourniquet released. We were tighter in flexion than extension. We sized the tibia to a size  E tibia which was placed in line with the anterior tibial crest.  Trial  reduction was done. Fluoroscopic views revealed a nice tibial cut,  maybe 1 degree of varus. This was still tight a little bit in flexion  and extension, another +2 tibial resection was made. Repeat trial  reduction was done. We were still tight in flexion, but had excellent  extension gap, so the PCL was released, this nicely balanced the knee. We then drilled our lug holes for the femur and tibia and good bone  quality for press-fit fixation. Lateral facet release was done on the  patella. We mixed 20 mL of Exparel with 30 mL of saline and 50 mL of  0.25% Marcaine. We injected the posteromedial capsule with 40 mL and 60  mL in the subcutaneous tissues. We then irrigated with 1500 mL of  normal saline and dried the bone. The size E TM tibia was press-fit and  had very good fixation followed by size 8 narrow femoral component. Repeat trial reduction was done. We then irrigated with another 1500 mL  of normal saline.   The final size 10 MC liner was locked into the tibial  tray. We had full extension of the knee with perfect midline tracking  of the patella limited only by her thigh touching her calf. Parapatellar approach was closed with interrupted #1 Vicryl suture, 2-0  Vicryl suture for subcutaneous tissues, and 2-0 Vicryl for subcuticular  stitch. Prineo Dermabond and soft dressings were applied. The patient  was taken to the recovery room in stable condition. POSTOPERATIVE PLANS:  1. The patient will be on typical total knee arthroplasty protocol. 2.  She will be on 2 doses of Ancef and 6 doses of clindamycin. 3.  She will be on Xarelto 10 mg a day for 21 days followed by 81 mg of  aspirin twice a day for another 3 weeks. Please note, prior to surgery, her range of motion to the right knee was  8 to 102 degrees.         Pako Blood MD    D: 04/13/2022 12:45:19      T: 04/13/2022 17:34:38     [email or web address]  Job#: [id number]     Doc#: [id number]    CC:

## 2022-04-14 PROBLEM — K59.03 DRUG-INDUCED CONSTIPATION: Status: ACTIVE | Noted: 2022-04-14

## 2022-04-14 PROBLEM — G47.9: Chronic | Status: ACTIVE | Noted: 2022-04-14

## 2022-04-14 PROBLEM — I10 PRIMARY HYPERTENSION: Chronic | Status: ACTIVE | Noted: 2022-04-14

## 2022-04-14 LAB
ANION GAP SERPL CALCULATED.3IONS-SCNC: 10 MMOL/L (ref 7–19)
BUN BLDV-MCNC: 11 MG/DL (ref 8–23)
CALCIUM SERPL-MCNC: 8.8 MG/DL (ref 8.8–10.2)
CHLORIDE BLD-SCNC: 102 MMOL/L (ref 98–111)
CO2: 28 MMOL/L (ref 22–29)
CREAT SERPL-MCNC: 0.6 MG/DL (ref 0.5–0.9)
GFR AFRICAN AMERICAN: >59
GFR NON-AFRICAN AMERICAN: >60
GLUCOSE BLD-MCNC: 119 MG/DL (ref 74–109)
HCT VFR BLD CALC: 32.7 % (ref 37–47)
HEMOGLOBIN: 10.1 G/DL (ref 12–16)
POTASSIUM REFLEX MAGNESIUM: 4.6 MMOL/L (ref 3.5–5)
SODIUM BLD-SCNC: 140 MMOL/L (ref 136–145)

## 2022-04-14 PROCEDURE — 2580000003 HC RX 258: Performed by: ORTHOPAEDIC SURGERY

## 2022-04-14 PROCEDURE — 97161 PT EVAL LOW COMPLEX 20 MIN: CPT

## 2022-04-14 PROCEDURE — 85014 HEMATOCRIT: CPT

## 2022-04-14 PROCEDURE — 85018 HEMOGLOBIN: CPT

## 2022-04-14 PROCEDURE — 80048 BASIC METABOLIC PNL TOTAL CA: CPT

## 2022-04-14 PROCEDURE — G0378 HOSPITAL OBSERVATION PER HR: HCPCS

## 2022-04-14 PROCEDURE — 6370000000 HC RX 637 (ALT 250 FOR IP): Performed by: ORTHOPAEDIC SURGERY

## 2022-04-14 PROCEDURE — 96361 HYDRATE IV INFUSION ADD-ON: CPT

## 2022-04-14 PROCEDURE — 97530 THERAPEUTIC ACTIVITIES: CPT

## 2022-04-14 PROCEDURE — 97116 GAIT TRAINING THERAPY: CPT

## 2022-04-14 PROCEDURE — 6360000002 HC RX W HCPCS: Performed by: ORTHOPAEDIC SURGERY

## 2022-04-14 PROCEDURE — 36415 COLL VENOUS BLD VENIPUNCTURE: CPT

## 2022-04-14 PROCEDURE — 2500000003 HC RX 250 WO HCPCS: Performed by: ORTHOPAEDIC SURGERY

## 2022-04-14 RX ADMIN — CLINDAMYCIN IN 5 PERCENT DEXTROSE 900 MG: 18 INJECTION, SOLUTION INTRAVENOUS at 08:17

## 2022-04-14 RX ADMIN — BISACODYL 5 MG: 5 TABLET, COATED ORAL at 08:15

## 2022-04-14 RX ADMIN — ACETAMINOPHEN 650 MG: 325 TABLET ORAL at 08:15

## 2022-04-14 RX ADMIN — ESCITALOPRAM OXALATE 20 MG: 10 TABLET ORAL at 08:14

## 2022-04-14 RX ADMIN — TRAMADOL HYDROCHLORIDE 50 MG: 50 TABLET, COATED ORAL at 15:38

## 2022-04-14 RX ADMIN — ACETAMINOPHEN 650 MG: 325 TABLET ORAL at 15:39

## 2022-04-14 RX ADMIN — OXYCODONE HYDROCHLORIDE 10 MG: 10 TABLET, FILM COATED, EXTENDED RELEASE ORAL at 20:46

## 2022-04-14 RX ADMIN — Medication 3000 MG: at 03:39

## 2022-04-14 RX ADMIN — OXYCODONE 10 MG: 5 TABLET ORAL at 17:38

## 2022-04-14 RX ADMIN — ERGOCALCIFEROL 50000 UNITS: 1.25 CAPSULE ORAL at 08:14

## 2022-04-14 RX ADMIN — LEVOTHYROXINE SODIUM 125 MCG: 125 TABLET ORAL at 05:31

## 2022-04-14 RX ADMIN — ZOLPIDEM TARTRATE 10 MG: 5 TABLET ORAL at 22:48

## 2022-04-14 RX ADMIN — OXYCODONE 10 MG: 5 TABLET ORAL at 13:20

## 2022-04-14 RX ADMIN — TRAMADOL HYDROCHLORIDE 50 MG: 50 TABLET, COATED ORAL at 08:15

## 2022-04-14 RX ADMIN — CLINDAMYCIN IN 5 PERCENT DEXTROSE 900 MG: 18 INJECTION, SOLUTION INTRAVENOUS at 15:38

## 2022-04-14 RX ADMIN — RIVAROXABAN 10 MG: 10 TABLET, FILM COATED ORAL at 17:38

## 2022-04-14 RX ADMIN — OXYCODONE 10 MG: 5 TABLET ORAL at 08:15

## 2022-04-14 RX ADMIN — ACETAMINOPHEN 650 MG: 325 TABLET ORAL at 03:32

## 2022-04-14 RX ADMIN — ACETAMINOPHEN 650 MG: 325 TABLET ORAL at 20:46

## 2022-04-14 RX ADMIN — OXYCODONE HYDROCHLORIDE 10 MG: 10 TABLET, FILM COATED, EXTENDED RELEASE ORAL at 08:15

## 2022-04-14 RX ADMIN — OXYCODONE 10 MG: 5 TABLET ORAL at 03:32

## 2022-04-14 RX ADMIN — OXYCODONE 20 MG: 5 TABLET ORAL at 22:31

## 2022-04-14 RX ADMIN — CLINDAMYCIN IN 5 PERCENT DEXTROSE 900 MG: 18 INJECTION, SOLUTION INTRAVENOUS at 22:33

## 2022-04-14 RX ADMIN — TRAMADOL HYDROCHLORIDE 50 MG: 50 TABLET, COATED ORAL at 20:46

## 2022-04-14 ASSESSMENT — PAIN DESCRIPTION - ORIENTATION
ORIENTATION: LEFT

## 2022-04-14 ASSESSMENT — PAIN SCALES - GENERAL
PAINLEVEL_OUTOF10: 10
PAINLEVEL_OUTOF10: 4
PAINLEVEL_OUTOF10: 10
PAINLEVEL_OUTOF10: 6
PAINLEVEL_OUTOF10: 10
PAINLEVEL_OUTOF10: 10
PAINLEVEL_OUTOF10: 0
PAINLEVEL_OUTOF10: 10
PAINLEVEL_OUTOF10: 6
PAINLEVEL_OUTOF10: 10
PAINLEVEL_OUTOF10: 0
PAINLEVEL_OUTOF10: 0
PAINLEVEL_OUTOF10: 9
PAINLEVEL_OUTOF10: 6
PAINLEVEL_OUTOF10: 10

## 2022-04-14 ASSESSMENT — PAIN DESCRIPTION - DESCRIPTORS
DESCRIPTORS: ACHING

## 2022-04-14 ASSESSMENT — PAIN DESCRIPTION - LOCATION
LOCATION: KNEE

## 2022-04-14 ASSESSMENT — PAIN DESCRIPTION - FREQUENCY
FREQUENCY: INTERMITTENT

## 2022-04-14 ASSESSMENT — PAIN DESCRIPTION - PAIN TYPE
TYPE: SURGICAL PAIN
TYPE: ACUTE PAIN;SURGICAL PAIN

## 2022-04-14 ASSESSMENT — PAIN DESCRIPTION - ONSET
ONSET: ON-GOING

## 2022-04-14 ASSESSMENT — PAIN - FUNCTIONAL ASSESSMENT
PAIN_FUNCTIONAL_ASSESSMENT: PREVENTS OR INTERFERES SOME ACTIVE ACTIVITIES AND ADLS

## 2022-04-14 NOTE — PROGRESS NOTES
Facility/Department: [facility] SURG SERVICES  Initial Assessment    NAME: Trudi Nguyen  : 1960  MRN: [id number]    Date of Service: 2022    Discharge Recommendations:  Continue to assess pending progress,24 hour supervision or assist        Assessment   Body structures, Functions, Activity limitations: Decreased functional mobility ; Decreased ADL status; Decreased ROM; Decreased strength;Decreased balance; Increased pain  Assessment: Pt ABLE TO STAND AND PIVOT TO RECLINER USING RW. WILL PROGRESS WITH HOUSEHOLD DISTANCES. PT Education: Plan of Care;PT Role  REQUIRES PT FOLLOW UP: Yes  Activity Tolerance  Activity Tolerance: Patient Tolerated treatment well       Patient Diagnosis(es): There were no encounter diagnoses. has a past medical history of Arthritis, Hypertension, PONV (postoperative nausea and vomiting), and Thyroid disease. has a past surgical history that includes Tubal ligation; Cholecystectomy; Cataract removal (Bilateral); Plantar fascia surgery (Bilateral); Thyroidectomy (N/A); and Total knee arthroplasty (Left, 2022). Restrictions  Restrictions/Precautions  Restrictions/Precautions: Weight Bearing,Fall Risk  Lower Extremity Weight Bearing Restrictions  Left Lower Extremity Weight Bearing: Weight Bearing As Tolerated  Vision/Hearing  Vision: Within Functional Limits  Hearing: Within functional limits     Subjective  General  Diagnosis: L TKR  Subjective  Subjective: Pt READY TO GET OOB  Pain Screening  Patient Currently in Pain: Yes  Pain Assessment  Pain Assessment: 0-10  Pain Level: 10  Pain Type: Acute pain;Surgical pain  Pain Location: Knee  Pain Orientation: Left  Pain Descriptors: Aching  Pain Frequency: Intermittent  Pain Onset: On-going  Functional Pain Assessment: Prevents or interferes some active activities and ADLs  Non-Pharmaceutical Pain Intervention(s): Cold applied; Ambulation/Increased Activity;Repositioned; Rest  Response to Pain Intervention: Patient Satisfied  Vital Signs  Patient Currently in Pain: Yes  Pre Treatment Pain Screening  Intervention List: Nurse/physician notified    Orientation  Orientation  Overall Orientation Status: Within Normal Limits  Social/Functional History  Social/Functional History  Lives With: Alone (FAMILY STAYING INITIALLY)  Type of Home: House  Home Layout: One level  Home Access: Stairs to enter without rails  Entrance Stairs - Number of Steps: 3  Bathroom Toilet: Standard  ADL Assistance: Independent  Homemaking Assistance: Independent  Ambulation Assistance: Independent  Transfer Assistance: Independent  Active : Yes  Cognition   Cognition  Overall Cognitive Status: WNL    Objective          AROM LLE (degrees)  LLE General AROM: SEATED KNEE FLEX ~ 80  Strength LLE  Comment: GROSSLY +3/5        Bed mobility  Supine to Sit: Minimal assistance  Transfers  Sit to Stand: Minimal Assistance  Stand to sit: Minimal Assistance  Bed to Chair: Minimal assistance  Stand Pivot Transfers: Minimal Assistance (STAND STEP, RW)  Comment: SUFFICIENT WEIGHTBEARING  Ambulation  Ambulation?: No     Balance  Sitting - Dynamic: Good  Standing - Dynamic: Fair;+        Plan   Plan  Times per week: AT LEAST 5-7  Current Treatment Recommendations: Balance Training,Functional Mobility Training,Gait Training,Transfer Training,Stair training,Safety Education & Training,Patient/Caregiver Education & Training  Safety Devices  Type of devices: Call light within reach      Goals  Short term goals  Time Frame for Short term goals: 14 DAYS  Short term goal 1: BED MOB MOD IND  Short term goal 2: TRANSFER SUPERVISION  Short term goal 3: ' RW SUPERVISION  Short term goal 4: UP/DOWN 3 STEPS CGA                 Fine Saliva, PT

## 2022-04-14 NOTE — PROGRESS NOTES
Physical Therapy  Name: Natali Amaya  MRN:  [id number]  Date of service:  4/14/2022 04/14/22 1444   Restrictions/Precautions   Restrictions/Precautions Weight Bearing; Fall Risk   Lower Extremity Weight Bearing Restrictions   Left Lower Extremity Weight Bearing Weight Bearing As Tolerated   Subjective   Subjective Pt agreed to therapy. Pain Screening   Patient Currently in Pain Yes   Pain Assessment   Pain Assessment 0-10   Pain Level 10  (pt reports having recently received pain meds)   Pain Type Acute pain;Surgical pain   Pain Location Knee   Pain Orientation Left   Pain Descriptors Aching   Pain Frequency Intermittent   Functional Pain Assessment Prevents or interferes some active activities and ADLs   Non-Pharmaceutical Pain Intervention(s) Ambulation/Increased Activity;Cold applied;Repositioned; Rest   Response to Pain Intervention Patient Satisfied   Bed Mobility   Supine to Sit Minimal assistance   Sit to Supine Minimal assistance   Transfers   Sit to Stand Minimal Assistance   Stand to sit Minimal Assistance   Ambulation   Ambulation? Yes   Ambulation 1   Surface level tile   Device Rolling Walker   Assistance Contact guard assistance   Gait Deviations Slow Lore;Decreased step length;Decreased step height   Distance 30'   Short term goals   Time Frame for Short term goals 14 DAYS   Short term goal 1 BED MOB MOD IND   Short term goal 2 TRANSFER SUPERVISION   Short term goal 3 ' RW SUPERVISION   Short term goal 4 UP/DOWN 3 STEPS CGA   Conditions Requiring Skilled Therapeutic Intervention   Body structures, Functions, Activity limitations Decreased functional mobility ; Decreased ADL status; Decreased ROM; Decreased strength;Decreased balance; Increased pain   Assessment Pt able to amb in room, no LOB. Pt did well with mobility. Assisted back to bed with all needs in reach.     Activity Tolerance   Activity Tolerance Patient Tolerated treatment well   Safety Devices   Type of devices Bed alarm in place;Call light within reach; Left in bed         Electronically signed by Cruz Mckeon PTA on 4/14/2022 at 2:53 PM

## 2022-04-14 NOTE — PROGRESS NOTES
Subjective:     Post-Operative Day: 1 Status Post left TKA. Pt is awake and alert. Ox3. Doing okay this am.  Pain only with movement of left knee. She has yet to ambulate with PT. No other issues. Systemic or Specific Complaints:No Complaints  no nausea and no vomiting Pain 3    Objective:     Patient Vitals for the past 24 hrs:   BP Temp Temp src Pulse Resp SpO2   04/14/22 0027 114/64 97.5 °F (36.4 °C) Temporal 77 14 90 %   04/13/22 2031 (!) 141/74 96.1 °F (35.6 °C) Temporal 93 16 93 %   04/13/22 1559 97/67 98.1 °F (36.7 °C) Temporal 88 16 96 %   04/13/22 1456 126/67 97.3 °F (36.3 °C) Temporal 91 18 94 %   04/13/22 1436 126/81 98.4 °F (36.9 °C) Temporal 82 16 95 %   04/13/22 1409 118/66 98.2 °F (36.8 °C) Temporal 83 18 94 %   04/13/22 1328 111/65 97.1 °F (36.2 °C) Temporal 75 16 95 %   04/13/22 1310 -- -- -- 77 -- --   04/13/22 1305 (!) 144/89 -- -- 75 12 94 %   04/13/22 1300 (!) 122/96 97.1 °F (36.2 °C) -- 73 12 94 %   04/13/22 1255 (!) 154/73 -- -- 77 10 92 %   04/13/22 1250 (!) 144/75 -- -- 73 12 94 %   04/13/22 1245 (!) 135/121 -- -- 73 13 94 %   04/13/22 1240 (!) 156/54 -- -- 69 16 91 %   04/13/22 1235 (!) 156/76 -- -- 75 15 91 %   04/13/22 1230 (!) 141/65 -- -- 73 12 92 %   04/13/22 1225 (!) 149/68 -- -- 76 18 95 %   04/13/22 1220 (!) 134/119 -- -- 75 14 92 %   04/13/22 1215 135/61 -- -- 74 17 95 %   04/13/22 1212 -- 97.1 °F (36.2 °C) -- -- -- --   04/13/22 1210 (!) 112/93 97.1 °F (36.2 °C) Temporal 72 16 92 %   04/13/22 0930 (!) 145/64 -- -- 68 17 96 %       General: alert, appears stated age, cooperative, no distress and morbidly obese   Exam: Fair active motion to left lower extremity   Wound: Wound clean and dry no evidence of infection. , No Drainage and Wound Intact   Neurovascular: Exam normal     DVT Exam: No evidence of DVT seen on physical exam.   Xray:  left Knee implants in good position       Data Review:  Recent Labs     04/14/22 0254   HGB 10.1*     Recent Labs     04/14/22 0254    K 4.6   CREATININE 0.6     Recent Labs     04/14/22  0254   LABGLOM >60     No results for input(s): INR in the last 72 hours. Assessment:     Status Post left TKA. Doing well postoperatively. Plan:     Pt is doing well. She will continue with her current nursing cares and therapy as tolerated. Plan for home tomorrow with home health as per total knee protocol.       Electronically signed by Anoop Millan PA-C on 4/14/2022 at 8:18 AM

## 2022-04-14 NOTE — CARE COORDINATION
Spoke with patient regarding MD orders for [facility] services. Pt agreeable and chose [facility]. Spoke with Joann Castillo. Referral faxed and Accepted. P. [phone]  F. [phone]  The Patient and/or patient representative was provided with a choice of provider and agrees   with the discharge plan. [x] Yes [] No    Freedom of choice list was provided with basic dialogue that supports the patient's individualized plan of care/goals, treatment preferences and shares the quality data associated with the providers.  [x] Yes [] No  Electronically signed by Manpreet Peterson on 4/14/22 at 10:29 AM CDT

## 2022-04-14 NOTE — CONSULTS
Referring Provider: Dr. Shay Moreira  Reason for Consultation: Medical management    Patient Care Team:  Maggi Parada as PCP - General    Chief complaint chronic knee pain    Subjective . History of present illness: The patient presents to the orthopedic service for TKA. They have failed outpatient conservative treatment of NSAIDS, muscle relaxer, physical therapy and opioid pain meds. The pain is affecting their activities of daily living and they have chosen to undergo surgical correction. We have been asked to provide medical consultation by the attending physician since their primary care provider does not attend here at [address] [place] [address] in their healthcare during the perioperative phase was discussed with the patient. All questions were encouraged and answered to the best of our ability. The postoperative pain is as expected. There are no other participating or relieving factors noted. Pleasant 58-year-old white female followed at [place] for blood pressure and generalized anxiety with sleep disorder presents postoperative day #1 from total knee arthroplasty. Patient doing well with PT OT and hopefully can be discharged home 1-2 days.       REVIEW OF SYSTEMS:    CONSTITUTIONAL:  Negative for anorexia, chills, fevers, night sweats and weight loss  EYES:  negative for eye dryness, icterus and redness  HEENT:   negative for dental problems, epistaxis, facial trauma and thrush  RESPIRATORY:  negative for chest tightness, cough, dyspnea on exertion, pneumonia and sputum  CARDIOVASCULAR: negative for chest pain, dyspnea, exertional chest pressure/discomfort, irregular heart beat, palpitations, paroxysmal nocturnal dyspnea and syncope  GASTROINTESTINAL:  negative for abdominal pain, hematemesis, jaundice, melena and rectal bleeding  MUSCULOSKELETAL:  negative for muscle weakness, myalgias and neck pain, chronic joint pain noted  NEUROLOGICAL:   negative for dizziness, headaches, seizures, speech problems, tremors and vertigo  INTEGUMENT: negative for pruritus, rash, skin color change and skin lesion(s)   A Full 14 point review of systems is negative outside those listed above and in the HPI      History    Past Medical History:   Diagnosis Date    Arthritis     Hypertension     PONV (postoperative nausea and vomiting)     Thyroid disease      Past Surgical History:   Procedure Laterality Date    CATARACT REMOVAL Bilateral     CHOLECYSTECTOMY      PLANTAR FASCIA SURGERY Bilateral     THYROIDECTOMY N/A     partial    TOTAL KNEE ARTHROPLASTY Left 4/13/2022    LEFT KNEE TOTAL ARTHROPLASTY performed by Darlyn Bradley MD at [facility]       Family History   Problem Relation Age of Onset    Cancer Mother         pancreas, liver, brain    Heart Disease Father     Mult Sclerosis Sister      Social History     Tobacco Use    Smoking status: Never Smoker    Smokeless tobacco: Never Used   Vaping Use    Vaping Use: Never used   Substance Use Topics    Alcohol use: Not on file    Drug use: Not on file     Medications Prior to Admission: zolpidem (AMBIEN) 10 MG tablet, Take 10 mg by mouth nightly as needed for Sleep.  vitamin D (ERGOCALCIFEROL) 1.25 MG (48667 UT) CAPS capsule, Take 50,000 Units by mouth once a week Indications: Thursday  olmesartan-amLODIPine-HCTZ 20-5-12.5 MG TABS, Take 1 tablet by mouth daily  escitalopram (LEXAPRO) 20 MG tablet, Take 20 mg by mouth daily  levothyroxine (SYNTHROID) 125 MCG tablet, Take 125 mcg by mouth Daily  diclofenac (CATAFLAM) 50 MG tablet, Take 100 mg by mouth daily  traMADol (ULTRAM) 50 MG tablet, Take 100 mg by mouth every 6 hours as needed for Pain. aspirin 81 MG EC tablet, Take 81 mg by mouth daily  Sulfa antibiotics  Objective     Vital Signs   All pre-op and post op vitals reviewed           Physical Exam:  Constitutional: oriented to person, place, and time. appears well-developed. HEENT:   Head: Normocephalic and atraumatic.    Eyes: Pupils are equal, round, and reactive to light. Neck: Neck supple. Cardiovascular: Regular rhythm and normal heart sounds. No lift or rub appreciated. Pulmonary/Chest: Effort normal and breath sounds normal. CTAB. No labored breating. Abdominal: Soft. Bowel sounds are normal. There is no appreciable  distension. There is no point tenderness. no rebounding or guarding. Musculoskeletal: Normal range of motion on other than surgically repaired joint . no edema or tenderness other than surgical area. Post-op changes noted  Neurological:  alert and oriented to person, place, and time. normal reflexes. No focal deficits  Skin: Skin is warm and dry. No new rashes appreciated. Results Review:   I reviewed the patient's new imaging results and agree with the interpretation. Principal Problem:    Arthritis of knee  Active Problems:    Primary hypertension    Primary sleep disturbance    Drug-induced constipation  Resolved Problems:    * No resolved hospital problems. *      Constipation-start with Miralax 1 capful BID until BM, then decrease to 1 x a day,then can step up to prokinetic to aid in opiod induced constipation,and ultimately end up with Relistor 12 mcg sub Q q 48 hours to block the effect of narcotics on the gut. Explained to the patient the negative effects of anesthesia and narcotic pain medication on the normal peristalsis of the gut. Hypertension-review pre and post BP's,will restart home BP meds when BP allows. Add Clonidine 0.1 mg q 4 hours prn if bp> 140/90,monitor BP and adjust meds as necessary. Hyperglycemia-related to intraoperative steroids. Anxiety/sleep disturbance-judicious use of opioid analgesics in the postoperative period along with her Ambien. We were asked to provide medical consultation by the attending physician during the perioperative phase.   They will return to their primary care provider and have been instructed to follow up with them concerning abnormal lab/x-rays. Questions were encouraged and answered to the best of our ability. We will be available for 30 days or until they return to their primary care provider, if they return to the emergency room in the next 30 days with a filipe-operative issue we will gladly admit them. Otherwise, they will be admitted to the hospitalist service. All questions and concerns addressed prior to discharge.      I discussed the patients findings and my recommendations with patient/family, staff and the Orthopaedic team.  Dulce Lomax MD  04/14/22  7:42 AM

## 2022-04-14 NOTE — CARE COORDINATION
Pal Zavala, SAQIB Ortho Navigator  [phone]    Patient would like dme items to be delivered to her home and placed under the carport. She will be discharging home tomorrow. Please call if you have any questions. Cristina Hurtanay  4/13/2022  7:52 AM   Admission  Description: 59year old female Department: L 5 Surg Services       Patient Demographics    Name Patient ID SSN Gender Identity Birth Date   Sushil Navas [id number] Female [birth year] (61 yrs)     Address Phone Email     [address]  (P)   [phone] (R)   [phone] (M) [email or web address]       Reg Status PCP Date Last Verified Next Review Date    Verified Binh Walker  [phone] 03/30/22 04/29/22        Insurance Payors as of 4/14/2022      [facility]    Plan: [place] Member: [id number] Effective from: 6/15/2019   Subscriber: Carlos Torres Subscriber ID: [id number] Guarantor: Carlos Torres       Emergency Contact(s)    Name Relation Home Work [place] Child [phone]     [facility] Child [phone]       Electronically signed by Dorys Jimenez RN on 4/14/2022 at 4:33 PM

## 2022-04-15 VITALS
TEMPERATURE: 98.6 F | DIASTOLIC BLOOD PRESSURE: 66 MMHG | WEIGHT: 276 LBS | BODY MASS INDEX: 45.98 KG/M2 | OXYGEN SATURATION: 95 % | HEIGHT: 65 IN | HEART RATE: 81 BPM | SYSTOLIC BLOOD PRESSURE: 151 MMHG | RESPIRATION RATE: 16 BRPM

## 2022-04-15 LAB
ANION GAP SERPL CALCULATED.3IONS-SCNC: 9 MMOL/L (ref 7–19)
BUN BLDV-MCNC: 13 MG/DL (ref 8–23)
CALCIUM SERPL-MCNC: 8.3 MG/DL (ref 8.8–10.2)
CHLORIDE BLD-SCNC: 101 MMOL/L (ref 98–111)
CO2: 27 MMOL/L (ref 22–29)
CREAT SERPL-MCNC: 0.8 MG/DL (ref 0.5–0.9)
GFR AFRICAN AMERICAN: >59
GFR NON-AFRICAN AMERICAN: >60
GLUCOSE BLD-MCNC: 110 MG/DL (ref 74–109)
HCT VFR BLD CALC: 31.7 % (ref 37–47)
HEMOGLOBIN: 9.8 G/DL (ref 12–16)
POTASSIUM REFLEX MAGNESIUM: 3.8 MMOL/L (ref 3.5–5)
SODIUM BLD-SCNC: 137 MMOL/L (ref 136–145)

## 2022-04-15 PROCEDURE — 97530 THERAPEUTIC ACTIVITIES: CPT

## 2022-04-15 PROCEDURE — 80048 BASIC METABOLIC PNL TOTAL CA: CPT

## 2022-04-15 PROCEDURE — G0378 HOSPITAL OBSERVATION PER HR: HCPCS

## 2022-04-15 PROCEDURE — 6370000000 HC RX 637 (ALT 250 FOR IP): Performed by: ORTHOPAEDIC SURGERY

## 2022-04-15 PROCEDURE — 2500000003 HC RX 250 WO HCPCS: Performed by: ORTHOPAEDIC SURGERY

## 2022-04-15 PROCEDURE — 97116 GAIT TRAINING THERAPY: CPT

## 2022-04-15 PROCEDURE — 36415 COLL VENOUS BLD VENIPUNCTURE: CPT

## 2022-04-15 PROCEDURE — 85018 HEMOGLOBIN: CPT

## 2022-04-15 PROCEDURE — 85014 HEMATOCRIT: CPT

## 2022-04-15 RX ORDER — OXYCODONE HYDROCHLORIDE 10 MG/1
10 TABLET ORAL SEE ADMIN INSTRUCTIONS
Qty: 90 TABLET | Refills: 0 | Status: SHIPPED | OUTPATIENT
Start: 2022-04-15 | End: 2022-05-15

## 2022-04-15 RX ORDER — ONDANSETRON 4 MG/1
4 TABLET, FILM COATED ORAL EVERY 8 HOURS PRN
Qty: 30 TABLET | Refills: 0 | Status: SHIPPED | OUTPATIENT
Start: 2022-04-15

## 2022-04-15 RX ORDER — RIVAROXABAN 10 MG/1
TABLET, FILM COATED ORAL
Qty: 21 TABLET | Refills: 0 | Status: SHIPPED | OUTPATIENT
Start: 2022-04-15 | End: 2022-04-15 | Stop reason: HOSPADM

## 2022-04-15 RX ADMIN — OXYCODONE HYDROCHLORIDE 10 MG: 10 TABLET, FILM COATED, EXTENDED RELEASE ORAL at 08:56

## 2022-04-15 RX ADMIN — TRAMADOL HYDROCHLORIDE 50 MG: 50 TABLET, COATED ORAL at 08:56

## 2022-04-15 RX ADMIN — BISACODYL 5 MG: 5 TABLET, COATED ORAL at 08:56

## 2022-04-15 RX ADMIN — OXYCODONE 10 MG: 5 TABLET ORAL at 05:28

## 2022-04-15 RX ADMIN — LEVOTHYROXINE SODIUM 125 MCG: 125 TABLET ORAL at 05:28

## 2022-04-15 RX ADMIN — ESCITALOPRAM OXALATE 20 MG: 10 TABLET ORAL at 08:56

## 2022-04-15 RX ADMIN — CLINDAMYCIN IN 5 PERCENT DEXTROSE 900 MG: 18 INJECTION, SOLUTION INTRAVENOUS at 05:28

## 2022-04-15 RX ADMIN — ACETAMINOPHEN 650 MG: 325 TABLET ORAL at 08:56

## 2022-04-15 RX ADMIN — OXYCODONE 10 MG: 5 TABLET ORAL at 08:56

## 2022-04-15 ASSESSMENT — PAIN DESCRIPTION - PAIN TYPE
TYPE: SURGICAL PAIN
TYPE: SURGICAL PAIN

## 2022-04-15 ASSESSMENT — PAIN - FUNCTIONAL ASSESSMENT
PAIN_FUNCTIONAL_ASSESSMENT: PREVENTS OR INTERFERES SOME ACTIVE ACTIVITIES AND ADLS
PAIN_FUNCTIONAL_ASSESSMENT: PREVENTS OR INTERFERES SOME ACTIVE ACTIVITIES AND ADLS

## 2022-04-15 ASSESSMENT — PAIN DESCRIPTION - FREQUENCY
FREQUENCY: INTERMITTENT
FREQUENCY: INTERMITTENT

## 2022-04-15 ASSESSMENT — PAIN DESCRIPTION - ORIENTATION
ORIENTATION: LEFT
ORIENTATION: LEFT

## 2022-04-15 ASSESSMENT — PAIN SCALES - GENERAL
PAINLEVEL_OUTOF10: 5
PAINLEVEL_OUTOF10: 8
PAINLEVEL_OUTOF10: 2
PAINLEVEL_OUTOF10: 5

## 2022-04-15 ASSESSMENT — PAIN DESCRIPTION - DESCRIPTORS
DESCRIPTORS: ACHING
DESCRIPTORS: ACHING

## 2022-04-15 ASSESSMENT — PAIN DESCRIPTION - ONSET
ONSET: ON-GOING
ONSET: ON-GOING

## 2022-04-15 ASSESSMENT — PAIN DESCRIPTION - LOCATION
LOCATION: KNEE
LOCATION: KNEE

## 2022-04-15 NOTE — PROGRESS NOTES
Subjective:     Post-Operative Day:2  Status Post left TKA. Pt is awake and alert. Ox3. Doing okay this am.  Pain only with movement of left knee. No other issues. Systemic or Specific Complaints:No Complaints  no nausea and no vomiting Pain 3    Objective:     Patient Vitals for the past 24 hrs:   BP Temp Temp src Pulse Resp SpO2   04/15/22 0244 (!) 141/67 97.7 °F (36.5 °C) Temporal 89 18 90 %   04/14/22 2040 (!) 176/75 99.1 °F (37.3 °C) Temporal 97 18 --   04/14/22 1715 (!) 170/69 98.2 °F (36.8 °C) Temporal 92 18 94 %   04/14/22 1048 (!) 149/56 96.3 °F (35.7 °C) Temporal 75 18 94 %   04/14/22 0821 (!) 144/58 96.3 °F (35.7 °C) Temporal 74 18 95 %       General: alert, appears stated age, cooperative, no distress and morbidly obese   Exam: Fair active motion to left lower extremity   Wound: Wound clean and dry no evidence of infection. , No Drainage and Wound Intact   Neurovascular: Exam normal     DVT Exam: No evidence of DVT seen on physical exam.   Xray:  left Knee implants in good position       Data Review:  Recent Labs     04/14/22  0254 04/15/22  0246   HGB 10.1* 9.8*     Recent Labs     04/15/22  0246      K 3.8   CREATININE 0.8     Recent Labs     04/15/22  0246   LABGLOM >60     No results for input(s): INR in the last 72 hours. Assessment:     Status Post left TKA. Doing well postoperatively. Plan:     Pt is doing well. She will continue with her current nursing cares and therapy as tolerated. Plan for home today with home health as per total knee protocol.       Electronically signed by Janay Sweet MD on 4/15/2022 at 6:17 AM

## 2022-04-15 NOTE — DISCHARGE INSTR - DIET
Good nutrition is important when healing from an illness, injury, or surgery. Follow any nutrition recommendations given to you during your hospital stay. If you were given an oral nutrition supplement while in the hospital, continue to take this supplement at home. You can take it with meals, in-between meals, and/or before bedtime. These supplements can be purchased at most local grocery stores, pharmacies, and chain [employer]-stores. If you have any questions about your diet or nutrition, call the hospital and ask for the dietitian.             Low carb / High protein

## 2022-04-15 NOTE — DISCHARGE SUMMARY
[facility] [address] [place] [address]  Discharge Summary    The Nubia Lara is a 59 y.o. female underwent L TKA procedure without complication. Nubia Lara was admitted to the floor following her   recovery in the PACU.      Discharge Diagnosis  left knee djd    Current Inpatient Medications    Current Facility-Administered Medications: zolpidem (AMBIEN) tablet 10 mg, 10 mg, Oral, Nightly PRN  vitamin D (ERGOCALCIFEROL) capsule 50,000 Units, 50,000 Units, Oral, Weekly  escitalopram (LEXAPRO) tablet 20 mg, 20 mg, Oral, Daily  levothyroxine (SYNTHROID) tablet 125 mcg, 125 mcg, Oral, Daily  0.9 % sodium chloride infusion, , IntraVENous, Continuous  0.9 % sodium chloride bolus, 500 mL, IntraVENous, PRN  sodium chloride flush 0.9 % injection 10 mL, 10 mL, IntraVENous, 2 times per day  sodium chloride flush 0.9 % injection 10 mL, 10 mL, IntraVENous, PRN  0.9 % sodium chloride infusion, 25 mL, IntraVENous, PRN  acetaminophen (TYLENOL) tablet 650 mg, 650 mg, Oral, Q6H  oxyCODONE (ROXICODONE) immediate release tablet 5 mg, 5 mg, Oral, Q4H PRN **OR** oxyCODONE (ROXICODONE) immediate release tablet 10 mg, 10 mg, Oral, Q4H PRN  bisacodyl (DULCOLAX) EC tablet 5 mg, 5 mg, Oral, Daily  ondansetron (ZOFRAN-ODT) disintegrating tablet 4 mg, 4 mg, Oral, Q8H PRN **OR** ondansetron (ZOFRAN) injection 4 mg, 4 mg, IntraVENous, Q6H PRN  oxyCODONE (OXYCONTIN) extended release tablet 10 mg, 10 mg, Oral, 2 times per day  oxyCODONE (ROXICODONE) immediate release tablet 10 mg, 10 mg, Oral, Q4H PRN **OR** oxyCODONE (ROXICODONE) immediate release tablet 20 mg, 20 mg, Oral, Q4H PRN  traMADol (ULTRAM) tablet 50 mg, 50 mg, Oral, Q6H  HYDROmorphone HCl PF (DILAUDID) injection 0.5 mg, 0.5 mg, IntraVENous, Q3H PRN **OR** HYDROmorphone HCl PF (DILAUDID) injection 1 mg, 1 mg, IntraVENous, Q3H PRN  HYDROmorphone HCl PF (DILAUDID) injection 1 mg, 1 mg, IntraVENous, Q3H PRN **OR** HYDROmorphone HCl PF (DILAUDID) injection 2 mg, 2 mg, IntraVENous, Q3H PRN  diphenhydrAMINE (BENADRYL) tablet 25 mg, 25 mg, Oral, Q6H PRN  clindamycin (CLEOCIN) 900 mg in dextrose 5 % 50 mL IVPB, 900 mg, IntraVENous, Q8H  rivaroxaban (XARELTO) tablet 10 mg, 10 mg, Oral, Daily    Post-operatively the patients diet was advanced as tolerated and their incision was checked on POD #1. The incision is dressing in place, clean, dry and intact with no signs of infection. The patient remained neurovascularly intact in the lower extremity and had intact pulses distally. Patients calf remained soft and showed no evidence of DVT. The patient was able to move their knee without any problems post-operatively. Physical therapy and occupational therapy were consulted and began working with the patient post-operatively. The patient progressed with PT/OT as would be expected and continued to improve through their stay. The patients pain was initially controlled with IV medications but we were able to transition to oral pain medications soon after arrival to the floor and their pain remained under good control through their hospital stay. From a medical standpoint the patient remained stable and continued to have the medicine team follow throughout their stay. The patients dressing was changed/incison was checked on day of d/c. The patient will be discharged at this time to Home  with their current diet restrictions and will continue to follow the knee precautions outlined to them by us and PT/OT. Condition on Discharge: Stable    Plan  Return visit in 6 weeks. .  Patient was instructed on the use of pain medications, the signs and symptoms of infection, and was given our number to call should they have any questions or concerns following discharge.

## 2022-04-15 NOTE — PROGRESS NOTES
POD #2  Progress Note  Date:4/15/2022       Room:[address] [place] [address]  Patient Name:Shey Campos     Date of Birth:     Age:61 y.o. POD # 2  Doing well with PT/OT    Subjective    Subjective:  Symptoms:  Stable. Diet:  Adequate intake. Activity level: Impaired due to pain. Pain:  She complains of pain that is moderate. She reports pain is unchanged. Pain is partially controlled. Review of Systems  Objective         Vitals Last 24 Hours:  TEMPERATURE:  Temp  Av.5 °F (36.4 °C)  Min: 96.3 °F (35.7 °C)  Max: 99.1 °F (37.3 °C)  RESPIRATIONS RANGE: Resp  Av  Min: 18  Max: 18  PULSE OXIMETRY RANGE: SpO2  Av.3 %  Min: 90 %  Max: 95 %  PULSE RANGE: Pulse  Av.4  Min: 74  Max: 97  BLOOD PRESSURE RANGE: Systolic (03PZS), GUB:468 , Min:141 , XND:227   ; Diastolic (49XTB), QL, Min:56, Max:75    I/O (24Hr): Intake/Output Summary (Last 24 hours) at 4/15/2022 0739  Last data filed at 4/15/2022 0244  Gross per 24 hour   Intake 4435 ml   Output 800 ml   Net 3635 ml     Objective:  General Appearance:  Comfortable and well-appearing. Vital signs: (most recent): Blood pressure (!) 141/67, pulse 89, temperature 97.7 °F (36.5 °C), temperature source Temporal, resp. rate 18, height 5' 5\" (1.651 m), weight 276 lb (125.2 kg), SpO2 90 %, not currently breastfeeding. Vital signs are normal.    Output: Producing urine and minimal stool output. HEENT: Normal HEENT exam.    Lungs:  Normal effort and normal respiratory rate. Heart: Normal rate. Regular rhythm. Abdomen: Bowel sounds are normal.     Extremities: (Postoperative changes)  Skin:  Warm and dry.       Labs/Imaging/Diagnostics    Labs:  CBC:  Recent Labs     04/14/22  0254 04/15/22  0246   HGB 10.1* 9.8*   HCT 32.7* 31.7*     CHEMISTRIES:  Recent Labs     04/14/22  0254 04/15/22  0246    137   K 4.6 3.8    101   CO2 28 27   BUN 11 13   CREATININE 0.6 0.8   GLUCOSE 119* 110*     PT/INR:No results for input(s): PROTIME, INR in the last 72 hours. APTT:No results for input(s): APTT in the last 72 hours. LIVER PROFILE:No results for input(s): AST, ALT, BILIDIR, BILITOT, ALKPHOS in the last 72 hours. Imaging Last 24 Hours:  XR KNEE LEFT (1-2 VIEWS)    Result Date: 4/13/2022  EXAMINATION: Left knee 2 views 4/13/2022 HISTORY: Postop FINDINGS: 2 view exam of the left knee reveals the patient's undergone a total knee arthroplasty. There is good anatomic alignment without evidence of complication. Signed by Dr Merry Stark Problems           Last Modified POA    * (Principal) Arthritis of knee 4/13/2022 Yes    Primary hypertension (Chronic) 4/14/2022 Yes    Primary sleep disturbance (Chronic) 4/14/2022 Yes    Drug-induced constipation 4/14/2022 No        Assessment:    Condition: In stable condition. Improving.   (    Constipation-start with Miralax 1 capful BID until BM, then decrease to 1 x a day,then can step up to prokinetic to aid in opiod induced constipation,and ultimately end up with Relistor 12 mcg sub Q q 48 hours to block the effect of narcotics on the gut. Explained to the patient the negative effects of anesthesia and narcotic pain medication on the normal peristalsis of the gut. Low hemoglobin postop expected-multivitamin with iron replace as needed. Follow-up with PCP to make sure hemoglobin returns to normal.    Obstructive sleep apnea-judicious use of opioid analgesics in this patient population. Medically stable for discharge home. We were asked to provide medical consultation by the attending physician during the perioperative phase. They will return to their primary care provider and have been instructed to follow up with them concerning abnormal lab/x-rays. Questions were encouraged and answered to the best of our ability.   We will be available for 30 days or until they return to their primary care provider, if they return to the emergency room in the next 30 days with a filipe-operative issue we will gladly admit them. Otherwise, they will be admitted to the hospitalist service. All questions and concerns addressed prior to discharge. ).        Electronically signed by Aviva Roy MD on 4/15/22 at 7:39 AM CDT

## 2022-04-15 NOTE — PROGRESS NOTES
Physical Therapy  [id number]     04/15/22 1014   Subjective   Subjective Pt states she is ready for therapy and wants to work on steps. Pain Screening   Patient Currently in Pain Yes   Pain Assessment   Pain Assessment 0-10   Pain Level 8   Pain Type Surgical pain   Pain Location Knee   Pain Orientation Left   Pain Descriptors Aching   Pain Frequency Intermittent   Pain Onset On-going   Functional Pain Assessment Prevents or interferes some active activities and ADLs   Non-Pharmaceutical Pain Intervention(s) Ambulation/Increased Activity   Response to Pain Intervention Patient Satisfied   Bed Mobility   Supine to Sit Minimal assistance   Scooting Independent   Transfers   Sit to Stand Contact guard assistance   Stand to sit Contact guard assistance   Ambulation   Ambulation? Yes   Ambulation 1   Surface level tile   Device Rolling Walker   Assistance Contact guard assistance   Gait Deviations Slow Lore;Decreased step length;Decreased step height   Distance 70'   Comments Pt amb in hallway    Stairs/Curb   Stairs? Yes   Stairs   # Steps  3   Stairs Height 4\"   Rails Bilateral   Device No Device;Hand Held Assist   Assistance Minimal assistance   Comment Pt used B rails, has no rails at home but a wall on the right. Will have 2 people helping her up the steps. Pt states she feels confident she will be able to get into the house with assistance. Exercises   Knee Long Arc Quad 5x   Ankle Pumps 10x   Short term goals   Time Frame for Short term goals 14 DAYS   Short term goal 1 BED MOB MOD IND   Short term goal 2 TRANSFER SUPERVISION   Short term goal 3 ' RW SUPERVISION   Short term goal 4 UP/DOWN 3 STEPS CGA   Conditions Requiring Skilled Therapeutic Intervention   Body structures, Functions, Activity limitations Decreased functional mobility ; Decreased ADL status; Decreased ROM; Decreased strength;Decreased balance; Increased pain   Assessment Pt was able to increase amb distance and perform up and down steps. Activity Tolerance   Activity Tolerance Patient Tolerated treatment well;Patient limited by endurance; Patient limited by pain   Safety Devices   Type of devices Left in chair;Call light within reach; Chair alarm in place

## 2022-04-18 ENCOUNTER — TELEPHONE (OUTPATIENT)
Dept: INPATIENT UNIT | Age: 62
End: 2022-04-18

## 2024-12-17 ENCOUNTER — OFFICE VISIT (OUTPATIENT)
Age: 64
End: 2024-12-17
Payer: MEDICARE

## 2024-12-17 VITALS — HEIGHT: 65 IN | WEIGHT: 234.6 LBS | BODY MASS INDEX: 39.09 KG/M2

## 2024-12-17 DIAGNOSIS — M17.10 ARTHRITIS OF KNEE: ICD-10-CM

## 2024-12-17 DIAGNOSIS — Z29.9 PROPHYLACTIC MEASURE: ICD-10-CM

## 2024-12-17 DIAGNOSIS — Z01.818 PRE-OP EVALUATION: Primary | ICD-10-CM

## 2024-12-17 DIAGNOSIS — M17.11 PRIMARY OSTEOARTHRITIS OF RIGHT KNEE: ICD-10-CM

## 2024-12-17 PROCEDURE — 1036F TOBACCO NON-USER: CPT | Performed by: ORTHOPAEDIC SURGERY

## 2024-12-17 PROCEDURE — G8417 CALC BMI ABV UP PARAM F/U: HCPCS | Performed by: ORTHOPAEDIC SURGERY

## 2024-12-17 PROCEDURE — G8400 PT W/DXA NO RESULTS DOC: HCPCS | Performed by: ORTHOPAEDIC SURGERY

## 2024-12-17 PROCEDURE — 3017F COLORECTAL CA SCREEN DOC REV: CPT | Performed by: ORTHOPAEDIC SURGERY

## 2024-12-17 PROCEDURE — 1090F PRES/ABSN URINE INCON ASSESS: CPT | Performed by: ORTHOPAEDIC SURGERY

## 2024-12-17 PROCEDURE — G8484 FLU IMMUNIZE NO ADMIN: HCPCS | Performed by: ORTHOPAEDIC SURGERY

## 2024-12-17 PROCEDURE — 1123F ACP DISCUSS/DSCN MKR DOCD: CPT | Performed by: ORTHOPAEDIC SURGERY

## 2024-12-17 PROCEDURE — G8427 DOCREV CUR MEDS BY ELIG CLIN: HCPCS | Performed by: ORTHOPAEDIC SURGERY

## 2024-12-17 PROCEDURE — 99215 OFFICE O/P EST HI 40 MIN: CPT | Performed by: ORTHOPAEDIC SURGERY

## 2024-12-17 RX ORDER — NITROFURANTOIN MACROCRYSTALS 50 MG/1
50 CAPSULE ORAL DAILY
Status: ON HOLD | COMMUNITY
Start: 2023-12-14 | End: 2025-01-20 | Stop reason: HOSPADM

## 2024-12-17 RX ORDER — TRAMADOL HYDROCHLORIDE 50 MG/1
50 TABLET ORAL EVERY 6 HOURS PRN
Status: ON HOLD | COMMUNITY
End: 2025-01-20 | Stop reason: HOSPADM

## 2024-12-17 RX ORDER — FLUTICASONE PROPIONATE 50 MCG
1 SPRAY, SUSPENSION (ML) NASAL DAILY PRN
COMMUNITY
Start: 2019-09-14

## 2024-12-17 RX ORDER — DICLOFENAC SODIUM 100 MG/1
100 TABLET, EXTENDED RELEASE ORAL DAILY
Status: ON HOLD | COMMUNITY
End: 2025-01-20 | Stop reason: HOSPADM

## 2024-12-17 ASSESSMENT — KOOS JR
STRAIGHTENING KNEE FULLY: MODERATE
STANDING UPRIGHT: MODERATE
KOOS JR TOTAL INTERVAL SCORE: 47.487
HOW SEVERE IS YOUR KNEE STIFFNESS AFTER FIRST WAKING IN MORNING: SEVERE
TWISING OR PIVOTING ON KNEE: MODERATE
BENDING TO THE FLOOR TO PICK UP OBJECT: MODERATE
RISING FROM SITTING: MODERATE
GOING UP OR DOWN STAIRS: SEVERE

## 2024-12-17 ASSESSMENT — PROMIS GLOBAL HEALTH SCALE
IN GENERAL, WOULD YOU SAY YOUR HEALTH IS...[ON A SCALE OF 1 (POOR) TO 5 (EXCELLENT)]: VERY GOOD
TO WHAT EXTENT ARE YOU ABLE TO CARRY OUT YOUR EVERYDAY PHYSICAL ACTIVITIES SUCH AS WALKING, CLIMBING STAIRS, CARRYING GROCERIES, OR MOVING A CHAIR [ON A SCALE OF 1 (NOT AT ALL) TO 5 (COMPLETELY)]?: MOSTLY
IN GENERAL, WOULD YOU SAY YOUR QUALITY OF LIFE IS...[ON A SCALE OF 1 (POOR) TO 5 (EXCELLENT)]: VERY GOOD
IN THE PAST 7 DAYS, HOW WOULD YOU RATE YOUR FATIGUE ON AVERAGE [ON A SCALE FROM 1 (NONE) TO 5 (VERY SEVERE)]?: MILD
IN GENERAL, PLEASE RATE HOW WELL YOU CARRY OUT YOUR USUAL SOCIAL ACTIVITIES (INCLUDES ACTIVITIES AT HOME, AT WORK, AND IN YOUR COMMUNITY, AND RESPONSIBILITIES AS A PARENT, CHILD, SPOUSE, EMPLOYEE, FRIEND, ETC) [ON A SCALE OF 1 (POOR) TO 5 (EXCELLENT)]?: VERY GOOD
WHO IS THE PERSON COMPLETING THE PROMIS V1.1 SURVEY?: SELF
HOW IS THE PROMIS V1.1 BEING ADMINISTERED?: PAPER
IN GENERAL, HOW WOULD YOU RATE YOUR PHYSICAL HEALTH [ON A SCALE OF 1 (POOR) TO 5 (EXCELLENT)]?: VERY GOOD
IN THE PAST 7 DAYS, HOW WOULD YOU RATE YOUR PAIN ON AVERAGE [ON A SCALE FROM 0 (NO PAIN) TO 10 (WORST IMAGINABLE PAIN)]?: 5
IN THE PAST 7 DAYS, HOW OFTEN HAVE YOU BEEN BOTHERED BY EMOTIONAL PROBLEMS, SUCH AS FEELING ANXIOUS, DEPRESSED, OR IRRITABLE [ON A SCALE FROM 1 (NEVER) TO 5 (ALWAYS)]?: SOMETIMES
SUM OF RESPONSES TO QUESTIONS 3, 6, 7, & 8: 17
IN GENERAL, HOW WOULD YOU RATE YOUR SATISFACTION WITH YOUR SOCIAL ACTIVITIES AND RELATIONSHIPS [ON A SCALE OF 1 (POOR) TO 5 (EXCELLENT)]?: VERY GOOD
IN GENERAL, HOW WOULD YOU RATE YOUR MENTAL HEALTH, INCLUDING YOUR MOOD AND YOUR ABILITY TO THINK [ON A SCALE OF 1 (POOR) TO 5 (EXCELLENT)]?: VERY GOOD
SUM OF RESPONSES TO QUESTIONS 2, 4, 5, & 10: 15

## 2024-12-17 NOTE — PROGRESS NOTES
[facility]  [facility] ORTHOPEDICS  [address]  [facility] [id number] [phone]     Patient: Shey Coppola   YOB: 1960   Date: 12/17/2024     Chief Complaint   Patient presents with    Pre-op Exam     RT TKR   SX: 01/20/2025       History of Present Illness  This is a 64 y.o. female  presents today preop talk for a right total knee replacement.     She done very well with her left knee ready get the right knee replaced.    Social history: She lives independently she is a very strong family support network with her to be helping her after    The patient has failed all nonsurgical treatments including intra-articular injections for a period of several months at which time they are no longer beneficial.  Other failed conservative treatment includes nonsteroidal medications, physical therapy, weight loss, and orthotics.  The patient's weight is not a factor in their disability and due to the advancement of the patient's arthritis and pain, they cannot participate in physical therapy.  The patient rates the pain as unbearable and affecting all activities of daily living.  Because of the persistent pain and limitations from the affected joint, the patient has also had safety issues.  All conservative treatment has failed and the patient now presents for preoperative surgical evaluation.    Past Medical History:   Diagnosis Date    Arthritis     Hypertension     PONV (postoperative nausea and vomiting)     Thyroid disease       Past Surgical History:   Procedure Laterality Date    CATARACT REMOVAL Bilateral     CHOLECYSTECTOMY      PLANTAR FASCIA SURGERY Bilateral     THYROIDECTOMY N/A     partial    TOTAL KNEE ARTHROPLASTY Left 4/13/2022    LEFT KNEE TOTAL ARTHROPLASTY performed by Lei Swanson MD at [facility] OR    TUBAL LIGATION        Social History     Socioeconomic History    Marital status:      Spouse name: None    Number of

## 2024-12-19 RX ORDER — PREGABALIN 25 MG/1
75 CAPSULE ORAL ONCE
Status: CANCELLED | OUTPATIENT
Start: 2025-01-20 | End: 2025-01-20

## 2024-12-19 RX ORDER — ACETAMINOPHEN 325 MG/1
1000 TABLET ORAL ONCE
Status: CANCELLED | OUTPATIENT
Start: 2025-01-20 | End: 2025-01-20

## 2024-12-19 RX ORDER — OXYCODONE HCL 10 MG/1
10 TABLET, FILM COATED, EXTENDED RELEASE ORAL ONCE
Status: CANCELLED | OUTPATIENT
Start: 2025-01-20 | End: 2025-01-20

## 2025-01-03 ENCOUNTER — HOSPITAL ENCOUNTER (OUTPATIENT)
Dept: PREADMISSION TESTING | Age: 65
Discharge: HOME | End: 2025-01-07
Payer: MEDICARE

## 2025-01-03 VITALS — HEIGHT: 65 IN | BODY MASS INDEX: 39.15 KG/M2 | WEIGHT: 235 LBS

## 2025-01-03 DIAGNOSIS — Z29.9 PROPHYLACTIC MEASURE: Primary | ICD-10-CM

## 2025-01-03 LAB
ALBUMIN SERPL-MCNC: 4.2 G/DL (ref 3.5–5.2)
ALP SERPL-CCNC: 100 U/L (ref 35–104)
ALT SERPL-CCNC: 22 U/L (ref 5–33)
ANION GAP SERPL CALCULATED.3IONS-SCNC: 10 MMOL/L (ref 7–19)
APTT PPP: 23.2 SEC (ref 26–36.2)
AST SERPL-CCNC: 18 U/L (ref 5–32)
BILIRUB SERPL-MCNC: 0.3 MG/DL (ref 0.2–1.2)
BILIRUB UR QL STRIP: NEGATIVE
BUN SERPL-MCNC: 28 MG/DL (ref 8–23)
CALCIUM SERPL-MCNC: 9.5 MG/DL (ref 8.8–10.2)
CHLORIDE SERPL-SCNC: 105 MMOL/L (ref 98–111)
CLARITY UR: CLEAR
CO2 SERPL-SCNC: 26 MMOL/L (ref 22–29)
COLOR UR: YELLOW
CREAT SERPL-MCNC: 0.7 MG/DL (ref 0.5–0.9)
ERYTHROCYTE [DISTWIDTH] IN BLOOD BY AUTOMATED COUNT: 13.7 % (ref 11.5–14.5)
GLUCOSE SERPL-MCNC: 83 MG/DL (ref 70–99)
GLUCOSE UR STRIP.AUTO-MCNC: NEGATIVE MG/DL
HCT VFR BLD AUTO: 41.1 % (ref 37–47)
HGB BLD-MCNC: 13.1 G/DL (ref 12–16)
HGB UR STRIP.AUTO-MCNC: NEGATIVE MG/L
INR PPP: 0.99 (ref 0.88–1.18)
KETONES UR STRIP.AUTO-MCNC: NEGATIVE MG/DL
LEUKOCYTE ESTERASE UR QL STRIP.AUTO: NEGATIVE
MCH RBC QN AUTO: 28.3 PG (ref 27–31)
MCHC RBC AUTO-ENTMCNC: 31.9 G/DL (ref 33–37)
MCV RBC AUTO: 88.8 FL (ref 81–99)
MRSA DNA SPEC QL NAA+PROBE: NOT DETECTED
NITRITE UR QL STRIP.AUTO: NEGATIVE
PH UR STRIP.AUTO: 5.5 [PH] (ref 5–8)
PLATELET # BLD AUTO: 337 K/UL (ref 130–400)
PMV BLD AUTO: 9.5 FL (ref 9.4–12.3)
POTASSIUM SERPL-SCNC: 4.5 MMOL/L (ref 3.5–5)
PROT SERPL-MCNC: 6.4 G/DL (ref 6.4–8.3)
PROT UR STRIP.AUTO-MCNC: NEGATIVE MG/DL
PROTHROMBIN TIME: 12.8 SEC (ref 12–14.6)
RBC # BLD AUTO: 4.63 M/UL (ref 4.2–5.4)
SODIUM SERPL-SCNC: 141 MMOL/L (ref 136–145)
SP GR UR STRIP.AUTO: 1.02 (ref 1–1.03)
UROBILINOGEN UR STRIP.AUTO-MCNC: 0.2 E.U./DL
WBC # BLD AUTO: 8.3 K/UL (ref 4.8–10.8)

## 2025-01-03 PROCEDURE — 85730 THROMBOPLASTIN TIME PARTIAL: CPT

## 2025-01-03 PROCEDURE — 85027 COMPLETE CBC AUTOMATED: CPT

## 2025-01-03 PROCEDURE — 81003 URINALYSIS AUTO W/O SCOPE: CPT

## 2025-01-03 PROCEDURE — 80053 COMPREHEN METABOLIC PANEL: CPT

## 2025-01-03 PROCEDURE — 87641 MR-STAPH DNA AMP PROBE: CPT

## 2025-01-03 PROCEDURE — 85610 PROTHROMBIN TIME: CPT

## 2025-01-03 PROCEDURE — 93005 ELECTROCARDIOGRAM TRACING: CPT | Performed by: ORTHOPAEDIC SURGERY

## 2025-01-03 RX ORDER — MUPIROCIN 20 MG/G
OINTMENT TOPICAL
Qty: 1 EACH | Refills: 0 | Status: SHIPPED | OUTPATIENT
Start: 2025-01-03

## 2025-01-03 RX ORDER — ASPIRIN 81 MG/1
81 TABLET ORAL DAILY
COMMUNITY

## 2025-01-03 RX ORDER — OLMESARTAN MEDOXOMIL 20 MG/1
20 TABLET ORAL DAILY
COMMUNITY

## 2025-01-03 RX ORDER — ZOLPIDEM TARTRATE 5 MG/1
5 TABLET ORAL NIGHTLY PRN
COMMUNITY

## 2025-01-03 NOTE — DISCHARGE INSTRUCTIONS
Jami, from Dr. Swanson's office, will call you the day before your surgery to give you an arrival time for your surgery.  If your surgery is scheduled for a Monday, she will call you on Friday.         BACTROBAN OINTMENT for the NARES    A script for Bactroban ointment has been call to your pharmacy or was given to you in written form by your surgeon.  The guidelines for the ointment use are as follows:    1)  Start using the ointment 7 days before your surgery date    2)  Use the ointment two times a day - morning and night    3)  Place the ointment on a Q-tip and swirl up in your nose making sure you cover completely       the skin just inside of each nostril.  Use one end of the Q-tip for each nostril.           Chlorhexidine Gluconate 4% Solution    Patient should shower with this soap a minimum of 3 consecutive showers (2 nights before surgery, the night before surgery and the morning of surgery) washing from the neck down (avoiding contact with genitalia).      DO NOT WASH YOUR HAIR OR FACE WITH THIS SOAP.  When washing with this soap, apply enough to suds up the body thoroughly, turn the water away from your body and allow the soap suds to remain on the body for 2 full minutes, then rinse body completely.      After using this soap on the body, please do not apply powders or lotions to your body.  After the shower the night before surgery, please dry off with a new towel, sleep in new freshly laundered pj's, and change your bed linen before going to sleep.      IF YOU HAVE A PET IN YOUR HOME, please do not allow your pet to sleep in the bed with you after you have showered with your surgery prep soap.     Please remember that it is not recommended to allow your pet to sleep with you post op, until your incision has healed.  This can increase your risk of post op infection.           Hair removal for your procedure will be necessary. Great care will be taken to protect your privacy and dignity during this

## 2025-01-05 LAB
EKG P AXIS: -135 DEGREES
EKG P-R INTERVAL: 128 MS
EKG Q-T INTERVAL: 458 MS
EKG QRS DURATION: 172 MS
EKG QTC CALCULATION (BAZETT): 453 MS
EKG T AXIS: 68 DEGREES

## 2025-01-05 PROCEDURE — 93010 ELECTROCARDIOGRAM REPORT: CPT | Performed by: INTERNAL MEDICINE

## 2025-01-20 ENCOUNTER — ANESTHESIA (OUTPATIENT)
Dept: OPERATING ROOM | Age: 65
End: 2025-01-20
Payer: MEDICARE

## 2025-01-20 ENCOUNTER — HOSPITAL ENCOUNTER (OUTPATIENT)
Age: 65
Setting detail: OBSERVATION
Discharge: HOME HEALTH | End: 2025-01-22
Attending: ORTHOPAEDIC SURGERY | Admitting: ORTHOPAEDIC SURGERY
Payer: MEDICARE

## 2025-01-20 ENCOUNTER — ANESTHESIA EVENT (OUTPATIENT)
Dept: OPERATING ROOM | Age: 65
End: 2025-01-20
Payer: MEDICARE

## 2025-01-20 ENCOUNTER — APPOINTMENT (OUTPATIENT)
Dept: GENERAL RADIOLOGY | Age: 65
End: 2025-01-20
Attending: ORTHOPAEDIC SURGERY
Payer: MEDICARE

## 2025-01-20 DIAGNOSIS — M17.11 PRIMARY OSTEOARTHRITIS OF RIGHT KNEE: Primary | ICD-10-CM

## 2025-01-20 PROCEDURE — G0378 HOSPITAL OBSERVATION PER HR: HCPCS

## 2025-01-20 PROCEDURE — 64447 NJX AA&/STRD FEMORAL NRV IMG: CPT

## 2025-01-20 PROCEDURE — 6360000002 HC RX W HCPCS

## 2025-01-20 PROCEDURE — 7100000001 HC PACU RECOVERY - ADDTL 15 MIN: Performed by: ORTHOPAEDIC SURGERY

## 2025-01-20 PROCEDURE — 73560 X-RAY EXAM OF KNEE 1 OR 2: CPT

## 2025-01-20 PROCEDURE — 94150 VITAL CAPACITY TEST: CPT

## 2025-01-20 PROCEDURE — 2720000010 HC SURG SUPPLY STERILE: Performed by: ORTHOPAEDIC SURGERY

## 2025-01-20 PROCEDURE — 2500000003 HC RX 250 WO HCPCS: Performed by: ORTHOPAEDIC SURGERY

## 2025-01-20 PROCEDURE — 6360000002 HC RX W HCPCS: Performed by: ORTHOPAEDIC SURGERY

## 2025-01-20 PROCEDURE — 7100000000 HC PACU RECOVERY - FIRST 15 MIN: Performed by: ORTHOPAEDIC SURGERY

## 2025-01-20 PROCEDURE — 3600000005 HC SURGERY LEVEL 5 BASE: Performed by: ORTHOPAEDIC SURGERY

## 2025-01-20 PROCEDURE — 27447 TOTAL KNEE ARTHROPLASTY: CPT | Performed by: ORTHOPAEDIC SURGERY

## 2025-01-20 PROCEDURE — 2709999900 HC NON-CHARGEABLE SUPPLY: Performed by: ORTHOPAEDIC SURGERY

## 2025-01-20 PROCEDURE — 2500000003 HC RX 250 WO HCPCS

## 2025-01-20 PROCEDURE — 3600000015 HC SURGERY LEVEL 5 ADDTL 15MIN: Performed by: ORTHOPAEDIC SURGERY

## 2025-01-20 PROCEDURE — 6370000000 HC RX 637 (ALT 250 FOR IP): Performed by: ORTHOPAEDIC SURGERY

## 2025-01-20 PROCEDURE — C1776 JOINT DEVICE (IMPLANTABLE): HCPCS | Performed by: ORTHOPAEDIC SURGERY

## 2025-01-20 PROCEDURE — 2580000003 HC RX 258: Performed by: ANESTHESIOLOGY

## 2025-01-20 PROCEDURE — 3700000000 HC ANESTHESIA ATTENDED CARE: Performed by: ORTHOPAEDIC SURGERY

## 2025-01-20 PROCEDURE — 6360000002 HC RX W HCPCS: Performed by: ANESTHESIOLOGY

## 2025-01-20 PROCEDURE — 2580000003 HC RX 258: Performed by: ORTHOPAEDIC SURGERY

## 2025-01-20 PROCEDURE — 3700000001 HC ADD 15 MINUTES (ANESTHESIA): Performed by: ORTHOPAEDIC SURGERY

## 2025-01-20 DEVICE — BASEPLATE TIB KEELED 0 DEG E RT KNEE SPIKE OSSEOTI PERSONA: Type: IMPLANTABLE DEVICE | Site: KNEE | Status: FUNCTIONAL

## 2025-01-20 DEVICE — COMPONENT FEM CR NAR 6 RT KNEE CEM COCR STRL PERSONA LTX: Type: IMPLANTABLE DEVICE | Site: KNEE | Status: FUNCTIONAL

## 2025-01-20 DEVICE — PSN MC VE ASF R 10MM 6-7/EF: Type: IMPLANTABLE DEVICE | Site: KNEE | Status: FUNCTIONAL

## 2025-01-20 RX ORDER — OXYCODONE HCL 10 MG/1
10 TABLET, FILM COATED, EXTENDED RELEASE ORAL ONCE
Status: COMPLETED | OUTPATIENT
Start: 2025-01-20 | End: 2025-01-20

## 2025-01-20 RX ORDER — 0.9 % SODIUM CHLORIDE 0.9 %
500 INTRAVENOUS SOLUTION INTRAVENOUS PRN
Status: DISCONTINUED | OUTPATIENT
Start: 2025-01-20 | End: 2025-01-22 | Stop reason: HOSPADM

## 2025-01-20 RX ORDER — LIDOCAINE HYDROCHLORIDE 10 MG/ML
INJECTION, SOLUTION INFILTRATION; PERINEURAL
Status: COMPLETED | OUTPATIENT
Start: 2025-01-20 | End: 2025-01-20

## 2025-01-20 RX ORDER — DIPHENHYDRAMINE HYDROCHLORIDE 50 MG/ML
25 INJECTION INTRAMUSCULAR; INTRAVENOUS EVERY 6 HOURS PRN
Status: DISCONTINUED | OUTPATIENT
Start: 2025-01-20 | End: 2025-01-22 | Stop reason: HOSPADM

## 2025-01-20 RX ORDER — LIDOCAINE HYDROCHLORIDE 10 MG/ML
INJECTION, SOLUTION INFILTRATION; PERINEURAL
Status: DISCONTINUED | OUTPATIENT
Start: 2025-01-20 | End: 2025-01-20 | Stop reason: SDUPTHER

## 2025-01-20 RX ORDER — SODIUM CHLORIDE 0.9 % (FLUSH) 0.9 %
5-40 SYRINGE (ML) INJECTION EVERY 12 HOURS SCHEDULED
Status: DISCONTINUED | OUTPATIENT
Start: 2025-01-20 | End: 2025-01-22 | Stop reason: HOSPADM

## 2025-01-20 RX ORDER — DIPHENHYDRAMINE HYDROCHLORIDE 50 MG/ML
12.5 INJECTION INTRAMUSCULAR; INTRAVENOUS
Status: DISCONTINUED | OUTPATIENT
Start: 2025-01-20 | End: 2025-01-20 | Stop reason: HOSPADM

## 2025-01-20 RX ORDER — SODIUM CHLORIDE, SODIUM LACTATE, POTASSIUM CHLORIDE, CALCIUM CHLORIDE 600; 310; 30; 20 MG/100ML; MG/100ML; MG/100ML; MG/100ML
INJECTION, SOLUTION INTRAVENOUS CONTINUOUS
Status: DISCONTINUED | OUTPATIENT
Start: 2025-01-20 | End: 2025-01-20 | Stop reason: HOSPADM

## 2025-01-20 RX ORDER — SODIUM CHLORIDE 9 MG/ML
INJECTION, SOLUTION INTRAVENOUS PRN
Status: DISCONTINUED | OUTPATIENT
Start: 2025-01-20 | End: 2025-01-20 | Stop reason: HOSPADM

## 2025-01-20 RX ORDER — ESCITALOPRAM OXALATE 10 MG/1
20 TABLET ORAL DAILY
Status: DISCONTINUED | OUTPATIENT
Start: 2025-01-21 | End: 2025-01-22 | Stop reason: HOSPADM

## 2025-01-20 RX ORDER — ROPIVACAINE HYDROCHLORIDE 5 MG/ML
INJECTION, SOLUTION EPIDURAL; INFILTRATION; PERINEURAL
Status: COMPLETED | OUTPATIENT
Start: 2025-01-20 | End: 2025-01-20

## 2025-01-20 RX ORDER — CYCLOBENZAPRINE HCL 10 MG
10 TABLET ORAL EVERY 12 HOURS PRN
Status: DISCONTINUED | OUTPATIENT
Start: 2025-01-20 | End: 2025-01-22 | Stop reason: HOSPADM

## 2025-01-20 RX ORDER — OXYCODONE HYDROCHLORIDE 5 MG/1
5 TABLET ORAL SEE ADMIN INSTRUCTIONS
Qty: 90 TABLET | Refills: 0 | Status: SHIPPED | OUTPATIENT
Start: 2025-01-20 | End: 2025-02-20

## 2025-01-20 RX ORDER — DIPHENHYDRAMINE HCL 25 MG
25 TABLET ORAL EVERY 6 HOURS PRN
Status: DISCONTINUED | OUTPATIENT
Start: 2025-01-20 | End: 2025-01-22 | Stop reason: HOSPADM

## 2025-01-20 RX ORDER — PROPOFOL 10 MG/ML
INJECTION, EMULSION INTRAVENOUS
Status: DISCONTINUED | OUTPATIENT
Start: 2025-01-20 | End: 2025-01-20 | Stop reason: SDUPTHER

## 2025-01-20 RX ORDER — ACETAMINOPHEN 500 MG
1000 TABLET ORAL ONCE
Status: COMPLETED | OUTPATIENT
Start: 2025-01-20 | End: 2025-01-20

## 2025-01-20 RX ORDER — BISACODYL 5 MG/1
5 TABLET, DELAYED RELEASE ORAL DAILY
Status: DISCONTINUED | OUTPATIENT
Start: 2025-01-20 | End: 2025-01-22 | Stop reason: HOSPADM

## 2025-01-20 RX ORDER — HYDRALAZINE HYDROCHLORIDE 20 MG/ML
10 INJECTION INTRAMUSCULAR; INTRAVENOUS
Status: DISCONTINUED | OUTPATIENT
Start: 2025-01-20 | End: 2025-01-20 | Stop reason: HOSPADM

## 2025-01-20 RX ORDER — PROCHLORPERAZINE EDISYLATE 5 MG/ML
5 INJECTION INTRAMUSCULAR; INTRAVENOUS
Status: DISCONTINUED | OUTPATIENT
Start: 2025-01-20 | End: 2025-01-20 | Stop reason: HOSPADM

## 2025-01-20 RX ORDER — SODIUM CHLORIDE 9 MG/ML
INJECTION, SOLUTION INTRAVENOUS CONTINUOUS
Status: DISCONTINUED | OUTPATIENT
Start: 2025-01-20 | End: 2025-01-22 | Stop reason: HOSPADM

## 2025-01-20 RX ORDER — SODIUM CHLORIDE 0.9 % (FLUSH) 0.9 %
5-40 SYRINGE (ML) INJECTION EVERY 12 HOURS SCHEDULED
Status: DISCONTINUED | OUTPATIENT
Start: 2025-01-20 | End: 2025-01-20 | Stop reason: HOSPADM

## 2025-01-20 RX ORDER — PREGABALIN 75 MG/1
75 CAPSULE ORAL ONCE
Status: COMPLETED | OUTPATIENT
Start: 2025-01-20 | End: 2025-01-20

## 2025-01-20 RX ORDER — MEPERIDINE HYDROCHLORIDE 25 MG/ML
12.5 INJECTION INTRAMUSCULAR; INTRAVENOUS; SUBCUTANEOUS
Status: DISCONTINUED | OUTPATIENT
Start: 2025-01-20 | End: 2025-01-20 | Stop reason: HOSPADM

## 2025-01-20 RX ORDER — ONDANSETRON 2 MG/ML
INJECTION INTRAMUSCULAR; INTRAVENOUS
Status: DISCONTINUED | OUTPATIENT
Start: 2025-01-20 | End: 2025-01-20 | Stop reason: SDUPTHER

## 2025-01-20 RX ORDER — HYDROMORPHONE HYDROCHLORIDE 1 MG/ML
0.25 INJECTION, SOLUTION INTRAMUSCULAR; INTRAVENOUS; SUBCUTANEOUS
Status: DISCONTINUED | OUTPATIENT
Start: 2025-01-20 | End: 2025-01-22 | Stop reason: HOSPADM

## 2025-01-20 RX ORDER — NALOXONE HYDROCHLORIDE 0.4 MG/ML
INJECTION, SOLUTION INTRAMUSCULAR; INTRAVENOUS; SUBCUTANEOUS PRN
Status: DISCONTINUED | OUTPATIENT
Start: 2025-01-20 | End: 2025-01-20 | Stop reason: HOSPADM

## 2025-01-20 RX ORDER — ONDANSETRON 4 MG/1
4 TABLET, FILM COATED ORAL DAILY PRN
Qty: 20 TABLET | Refills: 0 | Status: SHIPPED | OUTPATIENT
Start: 2025-01-20

## 2025-01-20 RX ORDER — CELECOXIB 100 MG/1
100 CAPSULE ORAL 2 TIMES DAILY
Status: DISCONTINUED | OUTPATIENT
Start: 2025-01-20 | End: 2025-01-21

## 2025-01-20 RX ORDER — EPHEDRINE SULFATE/0.9% NACL/PF 25 MG/5 ML
SYRINGE (ML) INTRAVENOUS
Status: DISCONTINUED | OUTPATIENT
Start: 2025-01-20 | End: 2025-01-20 | Stop reason: SDUPTHER

## 2025-01-20 RX ORDER — ONDANSETRON 2 MG/ML
4 INJECTION INTRAMUSCULAR; INTRAVENOUS EVERY 6 HOURS PRN
Status: DISCONTINUED | OUTPATIENT
Start: 2025-01-20 | End: 2025-01-22 | Stop reason: HOSPADM

## 2025-01-20 RX ORDER — LIDOCAINE HYDROCHLORIDE 10 MG/ML
1 INJECTION, SOLUTION EPIDURAL; INFILTRATION; INTRACAUDAL; PERINEURAL
Status: DISCONTINUED | OUTPATIENT
Start: 2025-01-20 | End: 2025-01-20 | Stop reason: HOSPADM

## 2025-01-20 RX ORDER — SODIUM CHLORIDE 0.9 % (FLUSH) 0.9 %
5-40 SYRINGE (ML) INJECTION PRN
Status: DISCONTINUED | OUTPATIENT
Start: 2025-01-20 | End: 2025-01-20 | Stop reason: HOSPADM

## 2025-01-20 RX ORDER — CYCLOBENZAPRINE HCL 10 MG
10 TABLET ORAL 3 TIMES DAILY PRN
Qty: 40 TABLET | Refills: 0 | Status: SHIPPED | OUTPATIENT
Start: 2025-01-20

## 2025-01-20 RX ORDER — POLYETHYLENE GLYCOL 3350 17 G/17G
17 POWDER, FOR SOLUTION ORAL 2 TIMES DAILY
Status: DISCONTINUED | OUTPATIENT
Start: 2025-01-20 | End: 2025-01-22 | Stop reason: HOSPADM

## 2025-01-20 RX ORDER — LABETALOL HYDROCHLORIDE 5 MG/ML
10 INJECTION, SOLUTION INTRAVENOUS
Status: DISCONTINUED | OUTPATIENT
Start: 2025-01-20 | End: 2025-01-20 | Stop reason: HOSPADM

## 2025-01-20 RX ORDER — FLUTICASONE PROPIONATE 50 MCG
1 SPRAY, SUSPENSION (ML) NASAL DAILY PRN
Status: DISCONTINUED | OUTPATIENT
Start: 2025-01-20 | End: 2025-01-22 | Stop reason: HOSPADM

## 2025-01-20 RX ORDER — HYDROMORPHONE HYDROCHLORIDE 1 MG/ML
0.5 INJECTION, SOLUTION INTRAMUSCULAR; INTRAVENOUS; SUBCUTANEOUS EVERY 5 MIN PRN
Status: DISCONTINUED | OUTPATIENT
Start: 2025-01-20 | End: 2025-01-20 | Stop reason: HOSPADM

## 2025-01-20 RX ORDER — OXYCODONE HYDROCHLORIDE 5 MG/1
5 TABLET ORAL EVERY 4 HOURS PRN
Status: DISCONTINUED | OUTPATIENT
Start: 2025-01-20 | End: 2025-01-22 | Stop reason: HOSPADM

## 2025-01-20 RX ORDER — HYDROMORPHONE HYDROCHLORIDE 1 MG/ML
1 INJECTION, SOLUTION INTRAMUSCULAR; INTRAVENOUS; SUBCUTANEOUS
Status: DISCONTINUED | OUTPATIENT
Start: 2025-01-20 | End: 2025-01-22 | Stop reason: HOSPADM

## 2025-01-20 RX ORDER — ROPIVACAINE HYDROCHLORIDE 5 MG/ML
30 INJECTION, SOLUTION EPIDURAL; INFILTRATION; PERINEURAL ONCE
Status: DISCONTINUED | OUTPATIENT
Start: 2025-01-20 | End: 2025-01-20 | Stop reason: HOSPADM

## 2025-01-20 RX ORDER — IPRATROPIUM BROMIDE AND ALBUTEROL SULFATE 2.5; .5 MG/3ML; MG/3ML
1 SOLUTION RESPIRATORY (INHALATION)
Status: DISCONTINUED | OUTPATIENT
Start: 2025-01-20 | End: 2025-01-20 | Stop reason: HOSPADM

## 2025-01-20 RX ORDER — FENTANYL CITRATE 50 UG/ML
INJECTION, SOLUTION INTRAMUSCULAR; INTRAVENOUS
Status: DISCONTINUED | OUTPATIENT
Start: 2025-01-20 | End: 2025-01-20 | Stop reason: SDUPTHER

## 2025-01-20 RX ORDER — SCOPOLAMINE 1 MG/3D
1 PATCH, EXTENDED RELEASE TRANSDERMAL
Status: DISCONTINUED | OUTPATIENT
Start: 2025-01-20 | End: 2025-01-20 | Stop reason: HOSPADM

## 2025-01-20 RX ORDER — SODIUM CHLORIDE 9 MG/ML
INJECTION, SOLUTION INTRAVENOUS PRN
Status: DISCONTINUED | OUTPATIENT
Start: 2025-01-20 | End: 2025-01-22 | Stop reason: HOSPADM

## 2025-01-20 RX ORDER — ONDANSETRON 4 MG/1
4 TABLET, ORALLY DISINTEGRATING ORAL EVERY 8 HOURS PRN
Status: DISCONTINUED | OUTPATIENT
Start: 2025-01-20 | End: 2025-01-22 | Stop reason: HOSPADM

## 2025-01-20 RX ORDER — HYDROMORPHONE HYDROCHLORIDE 1 MG/ML
0.5 INJECTION, SOLUTION INTRAMUSCULAR; INTRAVENOUS; SUBCUTANEOUS
Status: DISCONTINUED | OUTPATIENT
Start: 2025-01-20 | End: 2025-01-22 | Stop reason: HOSPADM

## 2025-01-20 RX ORDER — RIVAROXABAN 10 MG/1
TABLET, FILM COATED ORAL
Qty: 21 TABLET | Refills: 0 | Status: SHIPPED | OUTPATIENT
Start: 2025-01-20

## 2025-01-20 RX ORDER — SODIUM CHLORIDE 0.9 % (FLUSH) 0.9 %
5-40 SYRINGE (ML) INJECTION PRN
Status: DISCONTINUED | OUTPATIENT
Start: 2025-01-20 | End: 2025-01-22 | Stop reason: HOSPADM

## 2025-01-20 RX ORDER — DEXAMETHASONE SODIUM PHOSPHATE 10 MG/ML
10 INJECTION, SOLUTION INTRAMUSCULAR; INTRAVENOUS ONCE
Status: DISCONTINUED | OUTPATIENT
Start: 2025-01-20 | End: 2025-01-20 | Stop reason: HOSPADM

## 2025-01-20 RX ORDER — ERGOCALCIFEROL 1.25 MG/1
50000 CAPSULE, LIQUID FILLED ORAL WEEKLY
Status: DISCONTINUED | OUTPATIENT
Start: 2025-01-20 | End: 2025-01-22 | Stop reason: HOSPADM

## 2025-01-20 RX ORDER — BUPIVACAINE HYDROCHLORIDE 7.5 MG/ML
INJECTION, SOLUTION INTRASPINAL
Status: DISCONTINUED | OUTPATIENT
Start: 2025-01-20 | End: 2025-01-20 | Stop reason: SDUPTHER

## 2025-01-20 RX ORDER — MIDAZOLAM HYDROCHLORIDE 2 MG/2ML
2 INJECTION, SOLUTION INTRAMUSCULAR; INTRAVENOUS
Status: COMPLETED | OUTPATIENT
Start: 2025-01-20 | End: 2025-01-20

## 2025-01-20 RX ORDER — HYDROMORPHONE HYDROCHLORIDE 1 MG/ML
0.25 INJECTION, SOLUTION INTRAMUSCULAR; INTRAVENOUS; SUBCUTANEOUS EVERY 5 MIN PRN
Status: DISCONTINUED | OUTPATIENT
Start: 2025-01-20 | End: 2025-01-20 | Stop reason: HOSPADM

## 2025-01-20 RX ORDER — ACETAMINOPHEN 325 MG/1
650 TABLET ORAL EVERY 6 HOURS
Status: DISCONTINUED | OUTPATIENT
Start: 2025-01-20 | End: 2025-01-22 | Stop reason: HOSPADM

## 2025-01-20 RX ORDER — ZOLPIDEM TARTRATE 5 MG/1
5 TABLET ORAL NIGHTLY PRN
Status: DISCONTINUED | OUTPATIENT
Start: 2025-01-20 | End: 2025-01-22 | Stop reason: HOSPADM

## 2025-01-20 RX ORDER — OXYCODONE HYDROCHLORIDE 10 MG/1
10 TABLET ORAL EVERY 4 HOURS PRN
Status: DISCONTINUED | OUTPATIENT
Start: 2025-01-20 | End: 2025-01-22 | Stop reason: HOSPADM

## 2025-01-20 RX ORDER — FAMOTIDINE 20 MG/1
20 TABLET, FILM COATED ORAL ONCE
Status: DISCONTINUED | OUTPATIENT
Start: 2025-01-20 | End: 2025-01-20 | Stop reason: HOSPADM

## 2025-01-20 RX ORDER — TRANEXAMIC ACID 100 MG/ML
INJECTION, SOLUTION INTRAVENOUS
Status: DISCONTINUED | OUTPATIENT
Start: 2025-01-20 | End: 2025-01-20 | Stop reason: SDUPTHER

## 2025-01-20 RX ORDER — BUPIVACAINE HYDROCHLORIDE 2.5 MG/ML
INJECTION, SOLUTION INFILTRATION; PERINEURAL PRN
Status: DISCONTINUED | OUTPATIENT
Start: 2025-01-20 | End: 2025-01-20 | Stop reason: HOSPADM

## 2025-01-20 RX ADMIN — SODIUM CHLORIDE, POTASSIUM CHLORIDE, SODIUM LACTATE AND CALCIUM CHLORIDE: 600; 310; 30; 20 INJECTION, SOLUTION INTRAVENOUS at 11:13

## 2025-01-20 RX ADMIN — ACETAMINOPHEN 650 MG: 325 TABLET ORAL at 16:05

## 2025-01-20 RX ADMIN — OXYCODONE HYDROCHLORIDE 10 MG: 10 TABLET ORAL at 21:29

## 2025-01-20 RX ADMIN — HYDROMORPHONE HYDROCHLORIDE 0.5 MG: 1 INJECTION, SOLUTION INTRAMUSCULAR; INTRAVENOUS; SUBCUTANEOUS at 18:28

## 2025-01-20 RX ADMIN — EPHEDRINE SULFATE 10 MG: 5 INJECTION INTRAVENOUS at 14:13

## 2025-01-20 RX ADMIN — WATER 2000 MG: 1 INJECTION INTRAMUSCULAR; INTRAVENOUS; SUBCUTANEOUS at 21:28

## 2025-01-20 RX ADMIN — BUPIVACAINE HYDROCHLORIDE IN DEXTROSE 1.8 ML: 7.5 INJECTION, SOLUTION SUBARACHNOID at 13:12

## 2025-01-20 RX ADMIN — LIDOCAINE HYDROCHLORIDE 30 MG: 10 INJECTION, SOLUTION INFILTRATION; PERINEURAL at 13:12

## 2025-01-20 RX ADMIN — EPHEDRINE SULFATE 5 MG: 5 INJECTION INTRAVENOUS at 13:45

## 2025-01-20 RX ADMIN — PROPOFOL 30 MG: 10 INJECTION, EMULSION INTRAVENOUS at 13:15

## 2025-01-20 RX ADMIN — LIDOCAINE HYDROCHLORIDE 1 ML: 10 INJECTION, SOLUTION INFILTRATION; PERINEURAL at 12:47

## 2025-01-20 RX ADMIN — EPHEDRINE SULFATE 10 MG: 5 INJECTION INTRAVENOUS at 14:04

## 2025-01-20 RX ADMIN — SODIUM CHLORIDE: 9 INJECTION, SOLUTION INTRAVENOUS at 16:09

## 2025-01-20 RX ADMIN — TRANEXAMIC ACID 1000 MG: 1 INJECTION, SOLUTION INTRAVENOUS at 13:31

## 2025-01-20 RX ADMIN — BISACODYL 5 MG: 5 TABLET, COATED ORAL at 16:04

## 2025-01-20 RX ADMIN — EPHEDRINE SULFATE 5 MG: 5 INJECTION INTRAVENOUS at 13:56

## 2025-01-20 RX ADMIN — FENTANYL CITRATE 25 MCG: 0.05 INJECTION, SOLUTION INTRAMUSCULAR; INTRAVENOUS at 13:20

## 2025-01-20 RX ADMIN — ACETAMINOPHEN 650 MG: 325 TABLET ORAL at 21:29

## 2025-01-20 RX ADMIN — CEFAZOLIN 2000 MG: 2 INJECTION, POWDER, FOR SOLUTION INTRAMUSCULAR; INTRAVENOUS at 13:18

## 2025-01-20 RX ADMIN — SODIUM CHLORIDE: 9 INJECTION, SOLUTION INTRAVENOUS at 21:33

## 2025-01-20 RX ADMIN — RIVAROXABAN 10 MG: 10 TABLET, FILM COATED ORAL at 21:32

## 2025-01-20 RX ADMIN — TRANEXAMIC ACID 1000 MG: 1 INJECTION, SOLUTION INTRAVENOUS at 14:44

## 2025-01-20 RX ADMIN — ZOLPIDEM TARTRATE 5 MG: 5 TABLET, FILM COATED ORAL at 21:29

## 2025-01-20 RX ADMIN — OXYCODONE HYDROCHLORIDE 10 MG: 10 TABLET, FILM COATED, EXTENDED RELEASE ORAL at 11:19

## 2025-01-20 RX ADMIN — MIDAZOLAM 2 MG: 1 INJECTION INTRAMUSCULAR; INTRAVENOUS at 12:41

## 2025-01-20 RX ADMIN — FENTANYL CITRATE 25 MCG: 0.05 INJECTION, SOLUTION INTRAMUSCULAR; INTRAVENOUS at 14:34

## 2025-01-20 RX ADMIN — CELECOXIB 100 MG: 100 CAPSULE ORAL at 21:29

## 2025-01-20 RX ADMIN — ONDANSETRON 4 MG: 2 INJECTION INTRAMUSCULAR; INTRAVENOUS at 12:56

## 2025-01-20 RX ADMIN — ACETAMINOPHEN 1000 MG: 500 TABLET ORAL at 11:19

## 2025-01-20 RX ADMIN — ROPIVACAINE HYDROCHLORIDE 20 ML: 5 INJECTION, SOLUTION EPIDURAL; INFILTRATION; PERINEURAL at 12:47

## 2025-01-20 RX ADMIN — PREGABALIN 75 MG: 75 CAPSULE ORAL at 11:19

## 2025-01-20 RX ADMIN — OXYCODONE HYDROCHLORIDE 5 MG: 5 TABLET ORAL at 16:04

## 2025-01-20 RX ADMIN — PROPOFOL 160 MCG/KG/MIN: 10 INJECTION, EMULSION INTRAVENOUS at 13:16

## 2025-01-20 RX ADMIN — LIDOCAINE HYDROCHLORIDE 30 MG: 10 INJECTION, SOLUTION INFILTRATION; PERINEURAL at 13:15

## 2025-01-20 ASSESSMENT — LIFESTYLE VARIABLES: SMOKING_STATUS: 0

## 2025-01-20 ASSESSMENT — PAIN SCALES - GENERAL
PAINLEVEL_OUTOF10: 2
PAINLEVEL_OUTOF10: 7
PAINLEVEL_OUTOF10: 3
PAINLEVEL_OUTOF10: 2
PAINLEVEL_OUTOF10: 2
PAINLEVEL_OUTOF10: 1
PAINLEVEL_OUTOF10: 2
PAINLEVEL_OUTOF10: 6

## 2025-01-20 ASSESSMENT — PAIN DESCRIPTION - ONSET: ONSET: ON-GOING

## 2025-01-20 ASSESSMENT — PAIN DESCRIPTION - PAIN TYPE: TYPE: SURGICAL PAIN

## 2025-01-20 ASSESSMENT — PAIN - FUNCTIONAL ASSESSMENT
PAIN_FUNCTIONAL_ASSESSMENT: PREVENTS OR INTERFERES SOME ACTIVE ACTIVITIES AND ADLS
PAIN_FUNCTIONAL_ASSESSMENT: PREVENTS OR INTERFERES SOME ACTIVE ACTIVITIES AND ADLS
PAIN_FUNCTIONAL_ASSESSMENT: NONE - DENIES PAIN
PAIN_FUNCTIONAL_ASSESSMENT: PREVENTS OR INTERFERES SOME ACTIVE ACTIVITIES AND ADLS

## 2025-01-20 ASSESSMENT — PAIN DESCRIPTION - ORIENTATION
ORIENTATION: RIGHT

## 2025-01-20 ASSESSMENT — PAIN DESCRIPTION - LOCATION
LOCATION: KNEE

## 2025-01-20 ASSESSMENT — PAIN DESCRIPTION - DESCRIPTORS
DESCRIPTORS: DISCOMFORT
DESCRIPTORS: SORE
DESCRIPTORS: ACHING

## 2025-01-20 ASSESSMENT — ENCOUNTER SYMPTOMS: SHORTNESS OF BREATH: 0

## 2025-01-20 NOTE — H&P
[facility] Pre-Operative History and Physical    Patient Name: Melida  : 1960    /62   Pulse 64   Temp 97.8 °F (36.6 °C) (Temporal)   Resp 18   Ht 1.651 m (5' 5\")   Wt 107 kg (236 lb)   SpO2 96%   BMI 39.27 kg/m²     Pre-Operative Diagnosis:  Knee arthritits    Proposed Surgical Procedure:   Knee replacement      Past Medical Hisotry:       Diagnosis Date    Arthritis     Hypertension     PONV (postoperative nausea and vomiting)     Thyroid disease      Past Surgical History:       Procedure Laterality Date    CATARACT REMOVAL Bilateral     CHOLECYSTECTOMY      PLANTAR FASCIA SURGERY Bilateral     THYROIDECTOMY N/A     partial    TOTAL KNEE ARTHROPLASTY Left 2022    LEFT KNEE TOTAL ARTHROPLASTY performed by Lei Swanson MD at [facility] OR    TUBAL LIGATION         Medications:   Prior to Admission medications    Medication Sig Start Date End Date Taking? Authorizing Provider   zolpidem (AMBIEN) 5 MG tablet Take 1 tablet by mouth nightly as needed for Sleep.   Yes Mandi Nunez MD   olmesartan (BENICAR) 20 MG tablet Take 1 tablet by mouth daily   Yes Mandi Nunez MD   mupirocin (BACTROBAN) 2 % ointment Swab each nostril bid x 7 days pre op and am of surgery. 1/3/25  Yes Lei Swanson MD   nitrofurantoin (MACRODANTIN) 50 MG capsule Take 1 capsule by mouth daily 23  Yes Mandi Nunez MD   traMADol (ULTRAM) 50 MG tablet Take 1 tablet by mouth every 6 hours as needed.   Yes Mandi Nunez MD   fluticasone (FLONASE ALLERGY RELIEF) 50 MCG/ACT nasal spray 1 spray by Nasal route daily as needed 19  Yes Mandi Nunez MD   escitalopram (LEXAPRO) 20 MG tablet Take 1 tablet by mouth daily   Yes Mandi Nunez MD   levothyroxine (SYNTHROID) 125 MCG tablet Take 1 tablet by mouth Daily   Yes Mandi Nunez MD   aspirin 81 MG EC tablet Take 1 tablet by mouth daily    Mandi Nunez MD   Diclofenac Sodium 100 MG TB24 Take 100 mg

## 2025-01-20 NOTE — PROGRESS NOTES
4 Eyes Skin Assessment     NAME:  Shey Coppola  YOB: 1960  MEDICAL RECORD NUMBER:  [id number]    The patient is being assessed for  Admission    I agree that at least one RN has performed a thorough Head to Toe Skin Assessment on the patient. ALL assessment sites listed below have been assessed.      Areas assessed by both nurses:    Head, Face, Ears, Shoulders, Back, Chest, Arms, Elbows, Hands, Sacrum. Buttock, Coccyx, Ischium, and Legs. Feet and Heels        Does the Patient have a Wound? No noted wound(s)       Gonzalo Prevention initiated by RN: No  Wound Care Orders initiated by RN: No    Pressure Injury (Stage 3,4, Unstageable, DTI, NWPT, and Complex wounds) if present, place Wound referral order by RN under : No    New Ostomies, if present place, Ostomy referral order under : No     Nurse 1 eSignature: Electronically signed by Florinda Suárez RN on 1/20/25 at 4:01 PM CST    **SHARE this note so that the co-signing nurse can place an eSignature**    Nurse 2 eSignature: {Esignature:[id number]}

## 2025-01-20 NOTE — ANESTHESIA PROCEDURE NOTES
Spinal Block    End time: 1/20/2025 1:10 PM  Reason for block: primary anesthetic  Staffing  Performed: resident/CRNA   Resident/CRNA: Magnolia Denis APRN - CRNA  Performed by: Magnolia Denis APRN - CRNA  Authorized by: Magnolia Denis APRN - CRNA    Spinal Block  Patient position: sitting  Prep: Betadine  Patient monitoring: cardiac monitor, continuous pulse ox and frequent blood pressure checks  Approach: midline  Location: L3/L4  Provider prep: mask and sterile gloves  Local infiltration: lidocaine  Needle  Needle type: Pencan   Needle gauge: 24 G  Needle length: 4 in  Assessment  Events: cerebrospinal fluid  Swirl obtained: Yes  CSF: clear  Attempts: 2  Hemodynamics: stable  Additional Notes  Attempted x1 with Pencan 25G 3.5 in; changed to Pencan 24 4 in needle with successful CSF return  Performed by KHADAR  Preanesthetic Checklist  Completed: patient identified, IV checked, site marked, risks and benefits discussed, surgical/procedural consents, equipment checked, pre-op evaluation, timeout performed, anesthesia consent given, oxygen available, monitors applied/VS acknowledged and fire risk safety assessment completed and verbalized

## 2025-01-20 NOTE — ANESTHESIA POSTPROCEDURE EVALUATION
Department of Anesthesiology  Postprocedure Note    Patient: Shey Coppola  MRN: [id number]  YOB: 1960  Date of evaluation: 1/20/2025    Procedure Summary       Date: 01/20/25 Room / Location: [address]    Anesthesia Start: 1253 Anesthesia Stop: 1457    Procedure: ROBOTIC  RIGHT KNEE TOTAL ARTHROPLASTY (Right: Knee) Diagnosis:       Primary osteoarthritis of right knee      (Primary osteoarthritis of right knee [M17.11])    Surgeons: Lei Swanson MD Responsible Provider: Magnolia Denis APRN - CRNA    Anesthesia Type: spinal, regional ASA Status: 3            Anesthesia Type: No value filed.    Mesfin Phase I: Mesfin Score: 10    Mesfin Phase II:      Anesthesia Post Evaluation    Patient location during evaluation: PACU  Patient participation: complete - patient participated  Level of consciousness: awake  Airway patency: patent  Nausea & Vomiting: no nausea  Cardiovascular status: hemodynamically stable  Respiratory status: acceptable  Hydration status: stable  Comments: /63   Pulse 73   Temp 97.8 °F (36.6 °C)   Resp 13   Ht 1.651 m (5' 5\")   Wt 107 kg (236 lb)   SpO2 97%   BMI 39.27 kg/m²     Pain management: adequate    No notable events documented.

## 2025-01-20 NOTE — ANESTHESIA PROCEDURE NOTES
Peripheral Block    Patient location during procedure: holding area  Reason for block: post-op pain management  Start time: 1/20/2025 12:47 PM  Staffing  Performed: anesthesiologist   Anesthesiologist: Lisa Narayan MD  Performed by: Lisa Narayan MD  Authorized by: Lisa Narayan MD    Preanesthetic Checklist  Completed: patient identified, IV checked, site marked, risks and benefits discussed, surgical/procedural consents, equipment checked, pre-op evaluation, timeout performed, anesthesia consent given, oxygen available and monitors applied/VS acknowledged  Peripheral Block   Patient position: supine  Prep: ChloraPrep  Provider prep: mask and sterile gloves  Patient monitoring: cardiac monitor, continuous pulse ox, frequent blood pressure checks and IV access  Block type: Femoral  Adductor canal  Laterality: right  Injection technique: single-shot  Guidance: ultrasound guided  Local infiltration: ropivacaine  Infiltration strength: 0.5 %  Local infiltration: ropivacaine  Dose: 20 mL    Needle   Needle type: combined needle/nerve stimulator   Needle gauge: 22 G  Needle localization: ultrasound guidance  Needle length: 10 cm  Assessment   Injection assessment: negative aspiration for heme, no paresthesia on injection and local visualized surrounding nerve on ultrasound  Paresthesia pain: none  Slow fractionated injection: yes  Hemodynamics: stable  Outcomes: patient tolerated procedure well    Additional Notes  Under ultrasound (\"US\") guidance, a 22 gauge needle was inserted and placed in close proximity to the femoral nerve. Ultrasound was also used to visualize the spread of the anesthetic in close proximity to the nerve being blocked. The nerve appeared anatomically normal, and there were no abnormal pathological findings. A permanent image was recorded.     20 mL 0.5% Ropivacaine injected  Medications Administered  lidocaine injection 1% - Subcuticular   1 mL - 1/20/2025 12:47:00 PM  ropivacaine

## 2025-01-20 NOTE — ANESTHESIA PRE PROCEDURE
Department of Anesthesiology  Preprocedure Note       Name:  Shey Coppola   Age:  64 y.o.  :  1960                                          MRN:  [id number]         Date:  2025      Surgeon: Surgeon(s):  Lei Swanson MD    Procedure: Procedure(s):  ROBOTIC  RIGHT KNEE TOTAL ARTHROPLASTY    Medications prior to admission:   Prior to Admission medications    Medication Sig Start Date End Date Taking? Authorizing Provider   zolpidem (AMBIEN) 5 MG tablet Take 1 tablet by mouth nightly as needed for Sleep.   Yes Mandi Nunez MD   olmesartan (BENICAR) 20 MG tablet Take 1 tablet by mouth daily   Yes Mandi Nunez MD   mupirocin (BACTROBAN) 2 % ointment Swab each nostril bid x 7 days pre op and am of surgery. 1/3/25  Yes Lei Swanson MD   nitrofurantoin (MACRODANTIN) 50 MG capsule Take 1 capsule by mouth daily 23  Yes Mandi Nunez MD   traMADol (ULTRAM) 50 MG tablet Take 1 tablet by mouth every 6 hours as needed.   Yes Mandi Nunez MD   fluticasone (FLONASE ALLERGY RELIEF) 50 MCG/ACT nasal spray 1 spray by Nasal route daily as needed 19  Yes Mandi Nunez MD   escitalopram (LEXAPRO) 20 MG tablet Take 1 tablet by mouth daily   Yes Mandi Nunez MD   levothyroxine (SYNTHROID) 125 MCG tablet Take 1 tablet by mouth Daily   Yes Mandi Nunez MD   aspirin 81 MG EC tablet Take 1 tablet by mouth daily    Mandi Nunez MD   Diclofenac Sodium 100 MG TB24 Take 100 mg by mouth daily    Mandi Nunez MD   vitamin D (ERGOCALCIFEROL) 1.25 MG (65935 UT) CAPS capsule Take 1 capsule by mouth once a week Indications: Thursday    Mandi Nunez MD       Current medications:    Current Facility-Administered Medications   Medication Dose Route Frequency Provider Last Rate Last Admin   • lactated ringers infusion   IntraVENous Continuous Lisa Narayan  mL/hr at 25 1113 New Bag at 25 1113   • dexAMETHasone (PF)

## 2025-01-20 NOTE — BRIEF OP NOTE
Brief Postoperative Note      Patient: Shey Coppola  YOB: 1960  MRN: [id number]    Date of Procedure: 1/20/2025    Pre-Op Diagnosis Codes:      * Primary osteoarthritis of right knee [M17.11]    Post-Op Diagnosis: Same       Procedure(s):  ROBOTIC  RIGHT KNEE TOTAL ARTHROPLASTY    Surgeon(s):  Lei Swanson MD    Assistant:  First Assistant: Calos Hook    Anesthesia: Spinal    Estimated Blood Loss (mL): less than 100     Complications: None    Specimens:   * No specimens in log *    Implants:  Implant Name Type Inv. Item Serial No.  Lot No. LRB No. Used Action   BASEPLATE TIB KEELED 0 DEG E RT KNEE SPIKE OSSEOTI PERSONA - [id number]  BASEPLATE TIB KEELED 0 DEG E RT KNEE SPIKE OSSEOTI PERSONA  [employer]- [id number] Right 1 Implanted   COMPONENT FEM CR LACIE 6 RT KNEE CELESTINO COCR STRL PERSONA LTX - [id number]  COMPONENT FEM CR LACIE 6 RT KNEE CELESTINO COCR STRL PERSONA LTX  [employer]- [id number] Right 1 Implanted   PSN MC VE ASF R 10MM 6-7/EF - [id number]  PSN MC VE ASF R 10MM 6-7/EF  [employer]- [id number] Right 1 Implanted         Drains:   Urinary Catheter 01/20/25 Luis A (Active)       Findings:  Infection Present At Time Of Surgery (PATOS) (choose all levels that have infection present):  No infection present  Other Findings:     Electronically signed by Lei Swanson MD on 1/20/2025 at 2:37 PM

## 2025-01-21 PROBLEM — R73.9 HYPERGLYCEMIA: Status: ACTIVE | Noted: 2025-01-21

## 2025-01-21 LAB
ANION GAP SERPL CALCULATED.3IONS-SCNC: 11 MMOL/L (ref 7–19)
BUN SERPL-MCNC: 15 MG/DL (ref 8–23)
CALCIUM SERPL-MCNC: 8 MG/DL (ref 8.8–10.2)
CHLORIDE SERPL-SCNC: 103 MMOL/L (ref 98–111)
CO2 SERPL-SCNC: 24 MMOL/L (ref 22–29)
CREAT SERPL-MCNC: 0.7 MG/DL (ref 0.5–0.9)
GLUCOSE SERPL-MCNC: 115 MG/DL (ref 70–99)
HCT VFR BLD AUTO: 32.5 % (ref 37–47)
HGB BLD-MCNC: 10.2 G/DL (ref 12–16)
POTASSIUM SERPL-SCNC: 4.1 MMOL/L (ref 3.5–5)
SODIUM SERPL-SCNC: 138 MMOL/L (ref 136–145)

## 2025-01-21 PROCEDURE — 96374 THER/PROPH/DIAG INJ IV PUSH: CPT

## 2025-01-21 PROCEDURE — G0378 HOSPITAL OBSERVATION PER HR: HCPCS

## 2025-01-21 PROCEDURE — 97530 THERAPEUTIC ACTIVITIES: CPT

## 2025-01-21 PROCEDURE — 97161 PT EVAL LOW COMPLEX 20 MIN: CPT

## 2025-01-21 PROCEDURE — 80048 BASIC METABOLIC PNL TOTAL CA: CPT

## 2025-01-21 PROCEDURE — 6370000000 HC RX 637 (ALT 250 FOR IP): Performed by: STUDENT IN AN ORGANIZED HEALTH CARE EDUCATION/TRAINING PROGRAM

## 2025-01-21 PROCEDURE — 94760 N-INVAS EAR/PLS OXIMETRY 1: CPT

## 2025-01-21 PROCEDURE — 85018 HEMOGLOBIN: CPT

## 2025-01-21 PROCEDURE — 2500000003 HC RX 250 WO HCPCS: Performed by: ORTHOPAEDIC SURGERY

## 2025-01-21 PROCEDURE — 36415 COLL VENOUS BLD VENIPUNCTURE: CPT

## 2025-01-21 PROCEDURE — 96361 HYDRATE IV INFUSION ADD-ON: CPT

## 2025-01-21 PROCEDURE — 2580000003 HC RX 258: Performed by: ORTHOPAEDIC SURGERY

## 2025-01-21 PROCEDURE — 6360000002 HC RX W HCPCS: Performed by: ORTHOPAEDIC SURGERY

## 2025-01-21 PROCEDURE — 85014 HEMATOCRIT: CPT

## 2025-01-21 PROCEDURE — 6370000000 HC RX 637 (ALT 250 FOR IP): Performed by: ORTHOPAEDIC SURGERY

## 2025-01-21 RX ADMIN — ZOLPIDEM TARTRATE 5 MG: 5 TABLET, FILM COATED ORAL at 20:14

## 2025-01-21 RX ADMIN — DIPHENHYDRAMINE HYDROCHLORIDE 25 MG: 25 TABLET ORAL at 16:28

## 2025-01-21 RX ADMIN — LEVOTHYROXINE SODIUM 125 MCG: 25 TABLET ORAL at 06:20

## 2025-01-21 RX ADMIN — ACETAMINOPHEN 650 MG: 325 TABLET ORAL at 13:49

## 2025-01-21 RX ADMIN — ESCITALOPRAM OXALATE 20 MG: 10 TABLET ORAL at 09:33

## 2025-01-21 RX ADMIN — BISACODYL 5 MG: 5 TABLET, COATED ORAL at 09:33

## 2025-01-21 RX ADMIN — WATER 2000 MG: 1 INJECTION INTRAMUSCULAR; INTRAVENOUS; SUBCUTANEOUS at 06:20

## 2025-01-21 RX ADMIN — OXYCODONE HYDROCHLORIDE 10 MG: 10 TABLET ORAL at 04:17

## 2025-01-21 RX ADMIN — OXYCODONE HYDROCHLORIDE 10 MG: 10 TABLET ORAL at 20:15

## 2025-01-21 RX ADMIN — OXYCODONE HYDROCHLORIDE 10 MG: 10 TABLET ORAL at 09:33

## 2025-01-21 RX ADMIN — ACETAMINOPHEN 650 MG: 325 TABLET ORAL at 17:38

## 2025-01-21 RX ADMIN — POLYETHYLENE GLYCOL 3350 17 G: 17 POWDER, FOR SOLUTION ORAL at 09:33

## 2025-01-21 RX ADMIN — RIVAROXABAN 10 MG: 10 TABLET, FILM COATED ORAL at 17:38

## 2025-01-21 RX ADMIN — SODIUM CHLORIDE: 9 INJECTION, SOLUTION INTRAVENOUS at 04:20

## 2025-01-21 RX ADMIN — WATER 2000 MG: 1 INJECTION INTRAMUSCULAR; INTRAVENOUS; SUBCUTANEOUS at 13:49

## 2025-01-21 RX ADMIN — WATER 2000 MG: 1 INJECTION INTRAMUSCULAR; INTRAVENOUS; SUBCUTANEOUS at 20:14

## 2025-01-21 RX ADMIN — OXYCODONE HYDROCHLORIDE 10 MG: 10 TABLET ORAL at 16:24

## 2025-01-21 RX ADMIN — HYDROMORPHONE HYDROCHLORIDE 1 MG: 1 INJECTION, SOLUTION INTRAMUSCULAR; INTRAVENOUS; SUBCUTANEOUS at 13:46

## 2025-01-21 RX ADMIN — ACETAMINOPHEN 650 MG: 325 TABLET ORAL at 04:18

## 2025-01-21 ASSESSMENT — PAIN SCALES - GENERAL
PAINLEVEL_OUTOF10: 3
PAINLEVEL_OUTOF10: 10
PAINLEVEL_OUTOF10: 6
PAINLEVEL_OUTOF10: 5
PAINLEVEL_OUTOF10: 2
PAINLEVEL_OUTOF10: 6
PAINLEVEL_OUTOF10: 1
PAINLEVEL_OUTOF10: 10
PAINLEVEL_OUTOF10: 6

## 2025-01-21 ASSESSMENT — PAIN DESCRIPTION - ONSET
ONSET: ON-GOING
ONSET: ON-GOING

## 2025-01-21 ASSESSMENT — PAIN DESCRIPTION - DESCRIPTORS
DESCRIPTORS: ACHING;DISCOMFORT
DESCRIPTORS: DISCOMFORT

## 2025-01-21 ASSESSMENT — PAIN DESCRIPTION - ORIENTATION
ORIENTATION: RIGHT
ORIENTATION: RIGHT

## 2025-01-21 ASSESSMENT — PAIN DESCRIPTION - LOCATION
LOCATION: KNEE
LOCATION: KNEE

## 2025-01-21 ASSESSMENT — PAIN DESCRIPTION - FREQUENCY
FREQUENCY: CONTINUOUS
FREQUENCY: CONTINUOUS

## 2025-01-21 ASSESSMENT — PAIN - FUNCTIONAL ASSESSMENT
PAIN_FUNCTIONAL_ASSESSMENT: ACTIVITIES ARE NOT PREVENTED
PAIN_FUNCTIONAL_ASSESSMENT: PREVENTS OR INTERFERES SOME ACTIVE ACTIVITIES AND ADLS

## 2025-01-21 ASSESSMENT — PAIN DESCRIPTION - PAIN TYPE
TYPE: SURGICAL PAIN
TYPE: SURGICAL PAIN

## 2025-01-21 NOTE — PROGRESS NOTES
Physical Therapy Initial Assessment  Facility/Department: [facility] SURG SERVICES      Name: Shey Coppola  : 1960  MRN: [id number]  Date of Service: 2025    Discharge Recommendations:  Continue to assess pending progress, Patient would benefit from continued therapy after discharge (plans to d/c home with assist from family and friends)          Patient Diagnosis(es): The encounter diagnosis was Primary osteoarthritis of right knee.  Past Medical History:  has a past medical history of Arthritis, Hypertension, PONV (postoperative nausea and vomiting), and Thyroid disease.  Past Surgical History:  has a past surgical history that includes Tubal ligation; Cholecystectomy; Cataract removal (Bilateral); Plantar fascia surgery (Bilateral); Thyroidectomy (N/A); Total knee arthroplasty (Left, 2022); and Total knee arthroplasty (Right, 2025).    Assessment  Body Structures, Functions, Activity Limitations Requiring Skilled Therapeutic Intervention: Decreased strength;Decreased functional mobility ;Decreased ROM;Increased pain  Assessment: pt presents as expected post op. was able to ambulate to bathroom with no increase in pain. will progress to household distances for ambulation in future sessions. will practice stairs in future sessions if patient agrees.  Therapy Prognosis: Good  Decision Making: Low Complexity  Requires PT Follow-Up: Yes  Activity Tolerance  Activity Tolerance: Patient tolerated treatment well    Plan  Physical Therapy Plan  General Plan: 5-7 times per week  Therapy Duration: 2 Weeks  Current Treatment Recommendations: Strengthening, Balance training, Functional mobility training, Transfer training, Gait training, Therapeutic activities, Pain management, Stair training, Safety education & training  Safety Devices  Type of Devices: Call light within reach, Chair alarm in place, Gait belt, Left in chair    Restrictions  Restrictions/Precautions  Restrictions/Precautions: Weight

## 2025-01-21 NOTE — CONSULTS
Referring Provider: Dr. Swanson  Reason for Consultation: Medical comanagement    Patient Care Team:  Laxmi Goldberg APRN - NP as PCP - General    Chief complaint: Medical management for total knee arthroplasty    Subjective .     History of present illness:      64-year-old female with past medical history of hypertension, hypothyroidism, and depression who presents postoperative from total knee arthroplasty.  The patient has a long history of knee pain resistant to conservative measures and after careful consultation as an outpatient with Dr. Swanson, the patient elected to proceed with total knee arthroplasty.  The underwent total knee arthroplasty on 1/20 and tolerated procedure well.  They do not have any new postoperative complaints and are in the expected amount of pain.      REVIEW OF SYSTEMS:    CONSTITUTIONAL:  Negative for anorexia, chills, fevers, night sweats and weight loss  EYES:  negative for eye dryness, icterus and redness  HEENT:   negative for dental problems, epistaxis, facial trauma and thrush  RESPIRATORY:  negative for chest tightness, cough, dyspnea on exertion, pneumonia and sputum  CARDIOVASCULAR: negative for chest pain, dyspnea, exertional chest pressure/discomfort, irregular heart beat, palpitations, paroxysmal nocturnal dyspnea and syncope  GASTROINTESTINAL:  negative for abdominal pain, hematemesis, jaundice, melena and rectal bleeding  MUSCULOSKELETAL:  negative for muscle weakness, myalgias and neck pain, chronic joint pain noted  NEUROLOGICAL:   negative for dizziness, headaches, seizures, speech problems, tremors and vertigo  INTEGUMENT: negative for pruritus, rash, skin color change and skin lesion(s)   A Full 14 point review of systems is negative outside those listed above and in the HPI      History    Past Medical History:   Diagnosis Date    Arthritis     Hypertension     PONV (postoperative nausea and vomiting)     Thyroid disease      Past Surgical History:   Procedure

## 2025-01-21 NOTE — OP NOTE
[id number] [place] [address]                            OPERATIVE REPORT      PATIENT NAME: RYDER STEVENSON               : 1960  MED REC NO: [id number]                          ROOM: [id number]  ACCOUNT NO: [id number]                       ADMIT DATE: 2025  PROVIDER: Lei Swanson MD      DATE OF PROCEDURE:  2025    SURGEON:  Lei Swanson MD    PREOPERATIVE DIAGNOSIS:  Primary osteoarthritis, right knee.    POSTOPERATIVE DIAGNOSIS:  Primary osteoarthritis, right knee.    PROCEDURE:  Robotic assisted right total knee replacement with Svitlana Yudelka system.  Please note, the imageless system.    FIRST ASSISTANT:  Monster.    ANESTHESIA:  Spinal with adductor canal block.    ESTIMATED BLOOD LOSS:  100 mL.    FLUIDS:  800 mL.    URINE OUTPUT:  325 mL.    TOURNIQUET TIME:  30 minutes.    COMPONENTS USED:  Svitlana Persona knee system, femur size 6 narrow CR PPS femur, tibia size E OsseoTi tibia, polyethylene size 10 MC polyethylene.    INDICATIONS:  64-year-old lady with severe end-stage arthritis of the right knee, failing conservative care; because of this elected for the above procedure.    DESCRIPTION OF PROCEDURE:  After informed consent, she was given 2 g of Ancef, 1 g of tranexamic acid, underwent adductor canal block and a spinal anesthetic.  Gunn catheter inserted.  Tourniquet was placed around the right upper thigh, left leg placed in SCD, right leg prepped and draped in usual sterile fashion.  Leg was Esmarched, tourniquet inflated to 250 mmHg.  Midline incision was made.  Parapatellar approach was made.  Prepatellar fat pad and synovium were excised.  Superomedial and proximal tibial arrays were placed.    Robotic analysis revealed 15.5 degrees of flexion, 14 degrees of varus in extension, 4 medial, 4 lateral.  In flexion, 3.5 medial, 3.5 lateral.    Femoral settings:  Size 6 femur, 3 degrees of external rotation, 2 degrees of varus,

## 2025-01-21 NOTE — CARE COORDINATION
Shey Stevenson #[id number] (CSN:[id number]) (:1960 64 y.o. F) (Adm: 25)  [facility] 5 [id number]  PCP    TENZIN FITZGERALD  Demographics  CommentAddress   [address] [facility] [address]    Home Phone   [phone]    Work Phone   [phone]    Mobile Phone   [phone]             Social Security Number       Insurance Information   MEDICARE    Marital Status       Oriental orthodox   Yarsanism                Status   No [002]     Insurance Payors as of 2025    [facility]    Plan: [facility] Member: [id number] Effective from: 6/15/2019   Subscriber: SHEY STEVENSON Subscriber ID: [id number] Guarantor: SHEY STEVENSON     MEDICARE    Plan: MEDICARE PART A AND B Member: [id number] Effective from: 2024   Subscriber: SHEY STEVENSON Subscriber ID: [id number] Guarantor: SHEY STEVENSON     Documents Filed to Patient    Power of  Living Will Clinical Unknown Study Attachment Consent Form ABN Waiver After Visit Summary Lab Result Scan Code Status MyChart Status Advance Care Planning    Not on File  Not on File  Not on File  Not on File  Not on File  Filed  Filed  Not on File  FULL [Updated on 25 1540]  Active Jump to the Activity      Auth/Cert Information    Open auth/cert linked to hospital account [id number]      Emergency Contacts    Name Relation Home Work Mobile   SCAR LAWRENCE Child [phone]     MIGUEL CANNON Child [phone]       Other Contacts    Name Relation Home Work Mobile   Doretha Johnson Child [phone]  [phone]     Admission Information    Current Information    Attending Provider Admitting Provider Admission Type Admission Status   Lei Swanson MD Patel, Shiraz K, MD Elective Confirmed Admission          Admission Date/Time Discharge Date Hospital Service Auth/Cert Status   25  1043  Orthopedics Incomplete          Hospital Area Unit Room/Bed    [facility] 5 SURG SERVICES 0532/532-01              [facility]

## 2025-01-21 NOTE — PROGRESS NOTES
Subjective:     Post-Operative Day: 1 Status Post right total knee replacement  Systemic or Specific Complaints:No Complaints  no nausea Pain 5    Objective:     Patient Vitals for the past 24 hrs:   BP Temp Temp src Pulse Resp SpO2 Height Weight   01/21/25 0447 -- -- -- -- 16 -- -- --   01/21/25 0425 (!) 105/55 97.9 °F (36.6 °C) Temporal 87 17 97 % -- --   01/21/25 0417 -- -- -- -- 16 -- -- --   01/21/25 0015 103/61 98.2 °F (36.8 °C) Temporal 89 16 94 % -- --   01/20/25 2159 -- -- -- -- 16 -- -- --   01/20/25 2139 (!) 116/51 97.9 °F (36.6 °C) Temporal 81 17 98 % -- --   01/20/25 2129 -- -- -- -- 16 -- -- --   01/20/25 1953 132/63 97.5 °F (36.4 °C) Temporal 73 16 97 % -- --   01/20/25 1858 -- -- -- -- 15 -- -- --   01/20/25 1824 (!) 128/58 97.2 °F (36.2 °C) Temporal 73 16 100 % -- --   01/20/25 1624 (!) 154/71 97.7 °F (36.5 °C) Temporal 66 16 99 % -- --   01/20/25 1540 (!) 133/92 97.3 °F (36.3 °C) Temporal 63 16 97 % -- --   01/20/25 1530 (!) 141/68 -- -- 63 11 97 % -- --   01/20/25 1525 -- -- -- 64 14 98 % -- --   01/20/25 1521 (!) 150/70 -- -- 73 13 99 % -- --   01/20/25 1520 (!) 140/65 97.7 °F (36.5 °C) Temporal 69 16 97 % -- --   01/20/25 1515 (!) 148/71 -- -- 66 15 99 % -- --   01/20/25 1510 (!) 152/66 -- -- 71 12 98 % -- --   01/20/25 1505 (!) 142/75 -- -- 72 12 99 % -- --   01/20/25 1500 117/63 -- -- 72 15 97 % -- --   01/20/25 1456 117/63 97.8 °F (36.6 °C) Temporal 73 13 97 % -- --   01/20/25 1252 121/87 -- -- 68 19 96 % -- --   01/20/25 1251 -- -- -- 66 13 95 % -- --   01/20/25 1250 (!) 143/72 -- -- 68 13 96 % -- --   01/20/25 1245 -- -- -- 70 15 95 % -- --   01/20/25 1240 (!) 144/80 -- -- 67 12 97 % -- --   01/20/25 1058 139/62 97.8 °F (36.6 °C) Temporal 64 18 96 % 1.651 m (5' 5\") 107 kg (236 lb)       General: alert, appears stated age, and cooperative   Exam: Incision clean, dry, and intact, no evidence of infection.    Neurovascular: Exam normal       Data Review:  Recent Labs     01/21/25  0347   HGB

## 2025-01-21 NOTE — CARE COORDINATION
Patient has requested [facility] after reviewing Choice List. [facility] as accepted.  [facility]  P: [phone]  F: [phone]  Electronically signed by Johan Bryan RN, BSN on 1/21/25 at 11:40 AM CST

## 2025-01-21 NOTE — PROGRESS NOTES
Physical Therapy     01/21/25 1400   Subjective   Subjective attempted PM tx; pt declined per c/o pain. assisted with repositioning RLE to improved alignment and ice pack applied. will continue to follow.     Electronically signed by Rashaad Kumar PTA on 1/21/2025 at 2:12 PM

## 2025-01-21 NOTE — ANESTHESIA POSTPROCEDURE EVALUATION
Department of Anesthesiology  Postprocedure Note    Patient: Shey Coppola  MRN: [id number]  YOB: 1960  Date of evaluation: 1/21/2025    Procedure Summary       Date: 01/20/25 Room / Location: [address]    Anesthesia Start: 1253 Anesthesia Stop: 1457    Procedure: ROBOTIC  RIGHT KNEE TOTAL ARTHROPLASTY (Right: Knee) Diagnosis:       Primary osteoarthritis of right knee      (Primary osteoarthritis of right knee [M17.11])    Surgeons: Lei Swanson MD Responsible Provider: Magnolia Denis APRN - CRNA    Anesthesia Type: spinal, regional ASA Status: 3            Anesthesia Type: No value filed.    Mesfin Phase I: Mesfin Score: 10    Mesfin Phase II:      Anesthesia Post Evaluation    Patient location during evaluation: floor  Patient participation: complete - patient participated  Level of consciousness: awake and alert  Pain score: 3  Airway patency: patent  Nausea & Vomiting: no nausea and no vomiting  Cardiovascular status: hemodynamically stable  Respiratory status: acceptable  Hydration status: stable  Multimodal analgesia pain management approach  Pain management: adequate    No notable events documented.

## 2025-01-21 NOTE — CARE COORDINATION
01/21/25 1057   IMM Letter   Observation Status Letter date given: 01/21/25   Observation Status Letter time given: 1057   Observation Status Letter given to Patient/Family/Significant other/Guardian/POA/by: Wei SHOOK letter given to patient/family with oral explanation and questions addressed by:     Signed letter placed in pt chart.  Electronically signed by SEILNA DUNLAP on 1/21/2025 at 10:58 AM

## 2025-01-22 VITALS
HEART RATE: 75 BPM | HEIGHT: 65 IN | BODY MASS INDEX: 39.32 KG/M2 | SYSTOLIC BLOOD PRESSURE: 141 MMHG | OXYGEN SATURATION: 97 % | RESPIRATION RATE: 16 BRPM | WEIGHT: 236 LBS | DIASTOLIC BLOOD PRESSURE: 91 MMHG | TEMPERATURE: 98.6 F

## 2025-01-22 LAB
ANION GAP SERPL CALCULATED.3IONS-SCNC: 9 MMOL/L (ref 8–16)
BUN SERPL-MCNC: 11 MG/DL (ref 8–23)
CALCIUM SERPL-MCNC: 8.3 MG/DL (ref 8.8–10.2)
CHLORIDE SERPL-SCNC: 103 MMOL/L (ref 98–107)
CO2 SERPL-SCNC: 26 MMOL/L (ref 22–29)
CREAT SERPL-MCNC: 0.6 MG/DL (ref 0.5–0.9)
GLUCOSE SERPL-MCNC: 115 MG/DL (ref 70–99)
HCT VFR BLD AUTO: 33.1 % (ref 37–47)
HGB BLD-MCNC: 10.5 G/DL (ref 12–16)
POTASSIUM SERPL-SCNC: 3.8 MMOL/L (ref 3.5–5)
SODIUM SERPL-SCNC: 138 MMOL/L (ref 136–145)

## 2025-01-22 PROCEDURE — 97530 THERAPEUTIC ACTIVITIES: CPT

## 2025-01-22 PROCEDURE — 99024 POSTOP FOLLOW-UP VISIT: CPT | Performed by: PHYSICIAN ASSISTANT

## 2025-01-22 PROCEDURE — 6370000000 HC RX 637 (ALT 250 FOR IP): Performed by: STUDENT IN AN ORGANIZED HEALTH CARE EDUCATION/TRAINING PROGRAM

## 2025-01-22 PROCEDURE — 2500000003 HC RX 250 WO HCPCS: Performed by: ORTHOPAEDIC SURGERY

## 2025-01-22 PROCEDURE — G0378 HOSPITAL OBSERVATION PER HR: HCPCS

## 2025-01-22 PROCEDURE — 6370000000 HC RX 637 (ALT 250 FOR IP): Performed by: ORTHOPAEDIC SURGERY

## 2025-01-22 PROCEDURE — 96361 HYDRATE IV INFUSION ADD-ON: CPT

## 2025-01-22 PROCEDURE — 80048 BASIC METABOLIC PNL TOTAL CA: CPT

## 2025-01-22 PROCEDURE — 85014 HEMATOCRIT: CPT

## 2025-01-22 PROCEDURE — 97116 GAIT TRAINING THERAPY: CPT

## 2025-01-22 PROCEDURE — 36415 COLL VENOUS BLD VENIPUNCTURE: CPT

## 2025-01-22 PROCEDURE — 85018 HEMOGLOBIN: CPT

## 2025-01-22 PROCEDURE — 6360000002 HC RX W HCPCS: Performed by: ORTHOPAEDIC SURGERY

## 2025-01-22 RX ADMIN — WATER 2000 MG: 1 INJECTION INTRAMUSCULAR; INTRAVENOUS; SUBCUTANEOUS at 06:00

## 2025-01-22 RX ADMIN — LEVOTHYROXINE SODIUM 125 MCG: 25 TABLET ORAL at 06:00

## 2025-01-22 RX ADMIN — SODIUM CHLORIDE, PRESERVATIVE FREE 10 ML: 5 INJECTION INTRAVENOUS at 08:35

## 2025-01-22 RX ADMIN — POLYETHYLENE GLYCOL 3350 17 G: 17 POWDER, FOR SOLUTION ORAL at 08:30

## 2025-01-22 RX ADMIN — BISACODYL 5 MG: 5 TABLET, COATED ORAL at 08:30

## 2025-01-22 RX ADMIN — OXYCODONE HYDROCHLORIDE 10 MG: 10 TABLET ORAL at 06:00

## 2025-01-22 RX ADMIN — ESCITALOPRAM OXALATE 20 MG: 10 TABLET ORAL at 08:30

## 2025-01-22 RX ADMIN — ACETAMINOPHEN 650 MG: 325 TABLET ORAL at 06:01

## 2025-01-22 ASSESSMENT — PAIN DESCRIPTION - FREQUENCY
FREQUENCY: CONTINUOUS
FREQUENCY: CONTINUOUS

## 2025-01-22 ASSESSMENT — PAIN - FUNCTIONAL ASSESSMENT
PAIN_FUNCTIONAL_ASSESSMENT: PREVENTS OR INTERFERES SOME ACTIVE ACTIVITIES AND ADLS
PAIN_FUNCTIONAL_ASSESSMENT: ACTIVITIES ARE NOT PREVENTED

## 2025-01-22 ASSESSMENT — PAIN DESCRIPTION - ORIENTATION
ORIENTATION: RIGHT
ORIENTATION: RIGHT

## 2025-01-22 ASSESSMENT — PAIN DESCRIPTION - PAIN TYPE
TYPE: SURGICAL PAIN
TYPE: SURGICAL PAIN

## 2025-01-22 ASSESSMENT — PAIN DESCRIPTION - LOCATION
LOCATION: KNEE
LOCATION: KNEE

## 2025-01-22 ASSESSMENT — PAIN SCALES - GENERAL
PAINLEVEL_OUTOF10: 3
PAINLEVEL_OUTOF10: 5
PAINLEVEL_OUTOF10: 4
PAINLEVEL_OUTOF10: 1
PAINLEVEL_OUTOF10: 1

## 2025-01-22 ASSESSMENT — ENCOUNTER SYMPTOMS
WHEEZING: 0
SHORTNESS OF BREATH: 0
NAUSEA: 0
COUGH: 0
DIARRHEA: 0
ABDOMINAL PAIN: 0
CHEST TIGHTNESS: 0
CONSTIPATION: 0

## 2025-01-22 ASSESSMENT — PAIN DESCRIPTION - ONSET: ONSET: ON-GOING

## 2025-01-22 ASSESSMENT — PAIN DESCRIPTION - DESCRIPTORS
DESCRIPTORS: SORE
DESCRIPTORS: SORE

## 2025-01-22 NOTE — PROGRESS NOTES
Subjective:     Post-Operative Day: 2 Status Post right total knee replacement.  She is awake and alert. Ox3.  Doing well this am.  No new issues.  She is ready for home.  Systemic or Specific Complaints: No Complaints  no nausea Pain 4  Objective:     Patient Vitals for the past 24 hrs:   BP Temp Temp src Pulse Resp SpO2   01/22/25 0749 (!) 141/91 98.6 °F (37 °C) Temporal 75 16 97 %   01/22/25 0630 -- -- -- -- 17 --   01/22/25 0615 (!) 122/56 97.2 °F (36.2 °C) -- 78 18 96 %   01/22/25 0600 -- -- -- -- 15 --   01/21/25 2345 (!) 110/54 97.2 °F (36.2 °C) Temporal 76 16 95 %   01/21/25 2045 -- -- -- -- 16 --   01/21/25 2012 (!) 158/72 97.5 °F (36.4 °C) Temporal 74 16 99 %   01/21/25 1533 (!) 142/63 97.7 °F (36.5 °C) Temporal 81 16 94 %   01/21/25 1137 (!) 143/70 97.7 °F (36.5 °C) Temporal 75 16 95 %   01/21/25 0857 -- -- -- 72 16 94 %   01/21/25 0804 (!) 115/45 97.2 °F (36.2 °C) Temporal 68 16 95 %       General: alert, appears stated age, and cooperative   Exam: Incision clean, dry, and intact, no evidence of infection. Good active motion to the right lower extremity.   Neurovascular: Exam normal       Data Review:  Recent Labs     01/21/25  0347 01/22/25  0413   HGB 10.2* 10.5*     Recent Labs     01/22/25  0413      K 3.8   CREATININE 0.6     Recent Labs     01/22/25 0413   LABGLOM >90           Assessment:     Status Post right total knee replaced.      Plan:     Continue current post-op course.  She is ready for discharge to home with home health as per total knee protocol.  She may f/u in office in 6 weeks.      Electronically signed by Baljit Turner PA-C on 1/22/2025 at 8:00 AM

## 2025-01-22 NOTE — PROGRESS NOTES
Daily Progress Note  Shey Coppola  MRN: [id number] LOS: 0    Admit Date: 2025 7:33 AM    Subjective:         Interval History:    Reviewed overnight events and nursing notes.     Doing well, has not had a bowel movement yet.  Urinating independently and doing well with therapy.    Review of Systems   Constitutional:  Negative for chills and fever.   Respiratory:  Negative for cough, chest tightness, shortness of breath and wheezing.    Cardiovascular:  Negative for chest pain, palpitations and leg swelling.   Gastrointestinal:  Negative for abdominal pain, constipation, diarrhea and nausea.       DIET:  ADULT DIET; Regular    Medications:      sodium chloride 150 mL/hr at 25 0420    sodium chloride        vitamin D  50,000 Units Oral Weekly    escitalopram  20 mg Oral Daily    levothyroxine  125 mcg Oral QAM AC    sodium chloride flush  5-40 mL IntraVENous 2 times per day    acetaminophen  650 mg Oral Q6H    ceFAZolin (ANCEF) IV  2,000 mg IntraVENous Q8H    bisacodyl  5 mg Oral Daily    rivaroxaban  10 mg Oral Daily    polyethylene glycol  17 g Oral BID         Objective:     Vitals: BP (!) 122/56   Pulse 78   Temp 97.2 °F (36.2 °C)   Resp 17   Ht 1.651 m (5' 5\")   Wt 107 kg (236 lb)   SpO2 96%   BMI 39.27 kg/m²    Intake/Output Summary (Last 24 hours) at 2025 0733  Last data filed at 2025 0616  Gross per 24 hour   Intake 480 ml   Output 1400 ml   Net -920 ml    Temp (24hrs), Av.4 °F (36.3 °C), Min:97.2 °F (36.2 °C), Max:97.7 °F (36.5 °C)    Glucose:  No results found for: \"POCGLU\"  Physical Examination:   Objective:  General Appearance:  Comfortable and well-appearing.    Vital signs: (most recent): Blood pressure (!) 122/56, pulse 78, temperature 97.2 °F (36.2 °C), resp. rate 17, height 1.651 m (5' 5\"), weight 107 kg (236 lb), SpO2 96%, not currently breastfeeding.  No fever.    Lungs:  Normal effort and normal respiratory rate.  Breath sounds clear to auscultation.  She

## 2025-01-22 NOTE — DISCHARGE SUMMARY
[facility]  Dr. Lei Swanson  Discharge Summary     Shey Coppola is a 64 y.o. female underwent right total knee replacement procedure without complication.  Shey Coppola was admitted to the floor following her   recovery in the PACU.     Discharge Diagnosis   Right Knee Replacement    Current Inpatient Medications    Current Facility-Administered Medications: vitamin D (ERGOCALCIFEROL) capsule 50,000 Units, 50,000 Units, Oral, Weekly  escitalopram (LEXAPRO) tablet 20 mg, 20 mg, Oral, Daily  levothyroxine (SYNTHROID) tablet 125 mcg, 125 mcg, Oral, QAM AC  fluticasone (FLONASE) 50 MCG/ACT nasal spray 1 spray, 1 spray, Nasal, Daily PRN  zolpidem (AMBIEN) tablet 5 mg, 5 mg, Oral, Nightly PRN  0.9 % sodium chloride infusion, , IntraVENous, Continuous  sodium chloride 0.9 % bolus 500 mL, 500 mL, IntraVENous, PRN  sodium chloride flush 0.9 % injection 5-40 mL, 5-40 mL, IntraVENous, 2 times per day  sodium chloride flush 0.9 % injection 5-40 mL, 5-40 mL, IntraVENous, PRN  0.9 % sodium chloride infusion, , IntraVENous, PRN  acetaminophen (TYLENOL) tablet 650 mg, 650 mg, Oral, Q6H  ceFAZolin (ANCEF) 2,000 mg in sterile water 20 mL IV syringe, 2,000 mg, IntraVENous, Q8H  ondansetron (ZOFRAN-ODT) disintegrating tablet 4 mg, 4 mg, Oral, Q8H PRN **OR** ondansetron (ZOFRAN) injection 4 mg, 4 mg, IntraVENous, Q6H PRN  bisacodyl (DULCOLAX) EC tablet 5 mg, 5 mg, Oral, Daily  diphenhydrAMINE (BENADRYL) tablet 25 mg, 25 mg, Oral, Q6H PRN **OR** diphenhydrAMINE (BENADRYL) injection 25 mg, 25 mg, IntraVENous, Q6H PRN  cyclobenzaprine (FLEXERIL) tablet 10 mg, 10 mg, Oral, Q12H PRN  oxyCODONE (ROXICODONE) immediate release tablet 5 mg, 5 mg, Oral, Q4H PRN **OR** oxyCODONE HCl (OXY-IR) immediate release tablet 10 mg, 10 mg, Oral, Q4H PRN **OR** oxyCODONE (ROXICODONE) immediate release tablet 15 mg, 15 mg, Oral, Q4H PRN  HYDROmorphone HCl PF (DILAUDID) injection 0.25 mg, 0.25 mg, IntraVENous, Q3H PRN **OR**

## 2025-01-22 NOTE — DISCHARGE INSTRUCTIONS
Dr Swanson Total Knee Replacement Discharge Instructions     *Elevate legs at all times when not walking  * Use Ice Pack to affected extremity as needed for swelling and pain     * HOMEWORK          Be able to fully straighten leg by post-op appointment.  This is the most important thing to work on!!!          Use the ankle pillow or a towel roll under the ankle to work on straightening          You may work on bending the knee also    *Surgical Site Care:         The adhesive (glue strip) dressing can be removed in 3 weeks.         May shower on Wednesday and clean wound but do not scrub incision. Pat dry.          NO tub bathing or swimming.         Wash hands frequently during the day.     *Activity:      SAFETY FIRST ! WALK WITH A WALKER !!!                 Home Health therapy will come to the house three times a week:                    Get in and out of your bed                                                                                           Getting up and down out of the chair                                                                   Bathroom  / bathing activities                                                                                Do NOT walk for exercise ( it will increase pain and swelling )                            Walk several times a day but only for short distances             *Pain Medicines:          Keep a LOG of your medicines to track when pain med is due          Take prescribed medicine as needed for pain          Take Tylenol as your pain decreases          Take a stool softener of your choice while taking pain medications as they are very constipating ( examples:  colace  dulcolax  fiber   prune juice )      *To prevent blood clots:          Take prescribed blood thinner as directed.  (Xarelto 10 mg daily with supper for 3 weeks)          After completing Xarelto, you will take Aspirin 81 mg twice a day for the next three weeks if you can tolerate Aspirin

## 2025-01-22 NOTE — PROGRESS NOTES
Physical Therapy  Name: Shey Coppola  MRN:  [id number]  Date of service:  1/22/2025 01/22/25 0837   Restrictions/Precautions   Restrictions/Precautions Weight Bearing   Lower Extremity Weight Bearing Restrictions   Right Lower Extremity Weight Bearing Weight Bearing As Tolerated   Subjective   Subjective Pt agreed to therapy.   Pain Assessment   Pain Assessment 0-10   Pain Level 4   Pain Location Knee   Pain Orientation Right   Pain Descriptors Sore   Functional Pain Assessment Prevents or interferes some active activities and ADLs   Pain Type Surgical pain   Pain Frequency Continuous   Non-Pharmaceutical Pain Intervention(s) Ambulation/Increased Activity;Repositioned;Rest;Cold pack   Response to Pain Intervention Patient satisfied   Bed Mobility   Supine to Sit Minimal assistance   Transfers   Sit to Stand Contact guard assistance   Stand to Sit Contact guard assistance   Ambulation   WB Status WBAT   Ambulation   Surface Level tile   Device Rolling Walker   Assistance Contact guard assistance   Quality of Gait no LOB, mildly antalgic   Gait Deviations Slow Lore;Decreased step length   Distance 35' 40' 10'   Stairs/Curb   Stairs? Yes   Stairs   # Steps  5   Stairs Height 6\"  (4\")   Rails Bilateral   Device No Device   Assistance Contact guard assistance   Comment v/c's for correct technique   Short Term Goals   Time Frame for Short Term Goals 14 days   Short Term Goal 1 TF supervision   Short Term Goal 2 ambulate household distances w/ RW as indicated supervision   Short Term Goal 3 stairs supervision   Conditions Requiring Skilled Therapeutic Intervention   Body Structures, Functions, Activity Limitations Requiring Skilled Therapeutic Intervention Decreased strength;Decreased functional mobility ;Decreased ROM;Increased pain   Assessment Pt able to complete stair training with correct technique. Steady overall with amb and tolerated well. Assisted up to chair with all needs in reach.   Activity Tolerance

## 2025-01-22 NOTE — PROGRESS NOTES
Patient discharged home today with [facility].  Went over all discharge instructions and new medications with patient and provided a copy of all new medications to take home.  Patient verbalized understanding.  Patient was stable upon discharge.  Electronically signed by Glo Enriquez RN on 1/22/2025 at 10:26 AM

## 2025-01-22 NOTE — PROGRESS NOTES
Patient discharged home today with [facility].  Facility has been notified and all paperwork has been faxed.         P: [phone]   F: [phone]     Electronically signed by Glo Enriquez RN on 1/22/2025 at 10:28 AM

## 2025-02-04 RX ORDER — MUPIROCIN 20 MG/G
OINTMENT TOPICAL
Qty: 1 EACH | Refills: 0 | Status: SHIPPED | OUTPATIENT
Start: 2025-02-04

## 2025-02-05 ENCOUNTER — TELEPHONE (OUTPATIENT)
Age: 65
End: 2025-02-05

## 2025-02-05 NOTE — TELEPHONE ENCOUNTER
[facility] - Left Order # [id number] to be signed at .  Signed and dated this order and left back up at  to .

## 2025-03-05 ENCOUNTER — OFFICE VISIT (OUTPATIENT)
Age: 65
End: 2025-03-05

## 2025-03-05 VITALS — WEIGHT: 236 LBS | BODY MASS INDEX: 39.32 KG/M2 | HEIGHT: 65 IN

## 2025-03-05 DIAGNOSIS — Z96.651 STATUS POST RIGHT KNEE REPLACEMENT: Primary | ICD-10-CM

## 2025-03-05 PROCEDURE — 99024 POSTOP FOLLOW-UP VISIT: CPT | Performed by: PHYSICIAN ASSISTANT

## 2025-03-05 NOTE — PROGRESS NOTES
Provider, Historical, MD       Allergies:    Sulfa antibiotics and Celebrex [celecoxib]      PHYSICAL EXAM:    Musculoskeletal: right knee shows no effusion, redness or warmth.  Good active motion to the knee with full extension and flexion to 100 degrees.  She has good stability and strength 5/5  Incisions are clean, dry, and intact.  No signs of infection. Neurovascularly intact.    Radiology:   XR KNEE RIGHT (3 VIEWS)    Result Date: 3/5/2025  AP standing views, a right lateral view, and bilateral sunrise of the right knee was obtained in the office on today's visit, reviewed and interpreted by me.  Imaging quality is adequate for review.  There are no fractures or dislocations present, she has well aligned and placed bilateral total knee replacements. She has an OsseoTi press-fit tibia on the right and TM press-fit on the left.  Patellas are tracking to the midline.           --------------------------------------------------------------------------------------------------------------------    Assessment:   Aftercare following musculoskeletal surgery as above  Encounter Diagnosis   Name Primary?    Status post right knee replacement Yes        Plan:  She has had a much better experience overall with her right knee as compared to the left knee.  She is happy with both knees.    Return in about 3 months (around 6/5/2025) for right knee with xrays.     Orders Placed This Encounter    XR KNEE RIGHT (3 VIEWS)     Standing Status:   Future     Number of Occurrences:   1     Standing Expiration Date:   2/28/2026         Electronically signed by Baljit Turner PA-C on 3/5/2025 at 10:06 AM

## 2025-06-06 ENCOUNTER — OFFICE VISIT (OUTPATIENT)
Age: 65
End: 2025-06-06
Payer: MEDICARE

## 2025-06-06 VITALS — HEIGHT: 65 IN | WEIGHT: 236.4 LBS | BODY MASS INDEX: 39.39 KG/M2

## 2025-06-06 DIAGNOSIS — M17.11 PRIMARY OSTEOARTHRITIS OF RIGHT KNEE: ICD-10-CM

## 2025-06-06 DIAGNOSIS — Z96.651 STATUS POST RIGHT KNEE REPLACEMENT: Primary | ICD-10-CM

## 2025-06-06 PROCEDURE — G8417 CALC BMI ABV UP PARAM F/U: HCPCS | Performed by: PHYSICIAN ASSISTANT

## 2025-06-06 PROCEDURE — 1160F RVW MEDS BY RX/DR IN RCRD: CPT | Performed by: PHYSICIAN ASSISTANT

## 2025-06-06 PROCEDURE — 1159F MED LIST DOCD IN RCRD: CPT | Performed by: PHYSICIAN ASSISTANT

## 2025-06-06 PROCEDURE — 1036F TOBACCO NON-USER: CPT | Performed by: PHYSICIAN ASSISTANT

## 2025-06-06 PROCEDURE — G8427 DOCREV CUR MEDS BY ELIG CLIN: HCPCS | Performed by: PHYSICIAN ASSISTANT

## 2025-06-06 PROCEDURE — 3017F COLORECTAL CA SCREEN DOC REV: CPT | Performed by: PHYSICIAN ASSISTANT

## 2025-06-06 PROCEDURE — 99212 OFFICE O/P EST SF 10 MIN: CPT | Performed by: PHYSICIAN ASSISTANT

## 2025-06-06 PROCEDURE — 1123F ACP DISCUSS/DSCN MKR DOCD: CPT | Performed by: PHYSICIAN ASSISTANT

## 2025-06-06 PROCEDURE — 1090F PRES/ABSN URINE INCON ASSESS: CPT | Performed by: PHYSICIAN ASSISTANT

## 2025-06-06 PROCEDURE — G8400 PT W/DXA NO RESULTS DOC: HCPCS | Performed by: PHYSICIAN ASSISTANT

## 2025-06-06 NOTE — PROGRESS NOTES
tablet by mouth daily   Yes Provider, MD Mandi   levothyroxine (SYNTHROID) 125 MCG tablet Take 1 tablet by mouth Daily   Yes Provider, MD Mandi       Allergies:    Sulfa antibiotics and Celebrex [celecoxib]      PHYSICAL EXAM:    Musculoskeletal: right knee shows there's no joint effusion, redness or warmth.  She maintains full extension with flexion to 115 degrees.  No instability and good strength 4+/5  Incisions are clean, dry, and intact.  No signs of infection. Neurovascularly intact.    Radiology:   XR KNEE RIGHT (3 VIEWS)  Result Date: 6/6/2025  AP standing views, a right lateral view, and bilateral sunrise of the right knee was obtained in the office on today's visit, reviewed and interpreted by me.  Imaging quality is adequate for review.  There are no fractures or dislocations present, she has continued satisfactory placement to both total knee replacements.  She has a TM press-fit total knee on the left and OsseoTi press-fit total knee on the right.  No complicating features.     --------------------------------------------------------------------------------------------------------------------    Assessment:   Aftercare following musculoskeletal surgery as above  Encounter Diagnoses   Name Primary?    Status post right knee replacement Yes    Primary osteoarthritis of right knee         Plan:  She is doing great.  She will continue with her current activities as tolerated.  Recheck for year f/u.  Reminded her of the dental prophylaxis.    Return in about 8 months (around 2/6/2026) for f/u in Dr. Swanson's clinic for yearly check with xrays..     Orders Placed This Encounter    DICLOFENAC PO     Sig: Take 75 mg by mouth daily         Electronically signed by Blajit Turner PA-C on 6/9/2025 at 7:31 AM

## (undated) DEVICE — GLOVE SURG SZ 85 L12IN FNGR THK79MIL GRN LTX FREE

## (undated) DEVICE — SUTURE VCRL SZ 1 L18IN ABSRB UD L36MM CT-1 1/2 CIR J841D

## (undated) DEVICE — ZIMMER® STERILE DISPOSABLE TOURNIQUET CUFF WITH PLC, DUAL PORT, SINGLE BLADDER, 34 IN. (86 CM)

## (undated) DEVICE — GOWN,PREVENTION PLUS,XL,ST,24/CS: Brand: MEDLINE

## (undated) DEVICE — NEPTUNE E-SEP SMOKE EVACUATION PENCIL, COATED, 70MM BLADE, ROCKER SWITCH: Brand: NEPTUNE E-SEP

## (undated) DEVICE — SHEET,DRAPE,53X77,STERILE: Brand: MEDLINE

## (undated) DEVICE — GLOVE SURG SZ 85 CRM LTX FREE POLYISOPRENE POLYMER BEAD ANTI

## (undated) DEVICE — FAN SPRAY KIT: Brand: PULSAVAC®

## (undated) DEVICE — DUAL CUT SAGITTAL BLADE

## (undated) DEVICE — INSTRUMENT KIT ORTHOPEDIC KNEE NAVITRACK

## (undated) DEVICE — C-ARM: Brand: UNBRANDED

## (undated) DEVICE — GOWN,PRECEPT,XLNG/XXLARGE,STRL: Brand: MEDLINE

## (undated) DEVICE — SUTURE VICRYL + SZ 2-0 L36IN ABSRB UD L36MM CT-1 1/2 CIR VCP945H

## (undated) DEVICE — PIN FIX STERILE L150MM DIA3.2MM FLUT

## (undated) DEVICE — ELECTRODE ELECSURG L 10.2 CM PTFE COAT MONOPOLAR BLADE NSTK

## (undated) DEVICE — ADHESIVE SKIN CLOSURE WND 8.661X1.5 IN 22 CM LIQUIBAND SECUR

## (undated) DEVICE — E-Z CLEAN, NON-STICK, PTFE COATED, ELECTROSURGICAL BLADE ELECTRODE, 6.5 INCH (16.5 CM): Brand: MEGADYNE

## (undated) DEVICE — GLOVE SURG SZ 75 CRM LTX FREE POLYISOPRENE POLYMER BEAD ANTI

## (undated) DEVICE — 1LYRTR 16FR10ML100%SIL UMS SNP: Brand: MEDLINE INDUSTRIES, INC.

## (undated) DEVICE — Device

## (undated) DEVICE — NON-WOVEN ADHESIVE WOUND DRESSING: Brand: PRIMAPORE ADHESIVE DRESSING 30*10CM

## (undated) DEVICE — SURGICAL PROCEDURE PACK KNEE TOT DBD CDS LOURDES HOSP LF

## (undated) DEVICE — BIPOLAR SEALER 23-112-1 AQM 6.0: Brand: AQUAMANTYS ®

## (undated) DEVICE — SUTURE VCRL SZ 2-0 L27IN ABSRB UD L26MM SH 1/2 CIR J417H

## (undated) DEVICE — SUTURE VCRL SZ 2-0 L36IN ABSRB UD L36MM CT-1 1/2 CIR J945H

## (undated) DEVICE — UNDERGLOVE SURG SZ 8 FNGR THK0.21MIL GRN LTX BEAD CUF

## (undated) DEVICE — SOLUTION WND IRRIGATION 32 OZ 0.24 HYPOCHLOROUS ACD

## (undated) DEVICE — TRAY EPI 25GA L3.5IN 0.75% BIPIVCAIN 8.25% D CONTAIN BPA

## (undated) DEVICE — BANDAGE COMPR W6INXL10YD ST M E WHITE/BEIGE

## (undated) DEVICE — SYSTEM SKIN CLSR 22CM DERMBND PRINEO

## (undated) DEVICE — 3M™ STERI-DRAPE™ INSTRUMENT POUCH 1018: Brand: STERI-DRAPE™

## (undated) DEVICE — SUTURE VICRYL + SZ 1 L18IN ABSRB UD L36MM CT-1 1/2 CIR VCP841D

## (undated) DEVICE — DRAPE ROSA RBTC UNT 20 DROP

## (undated) DEVICE — PIN FIX STERILE L80MM DIA3.2MM FLUT CAS

## (undated) DEVICE — CHLORAPREP 26ML ORANGE

## (undated) DEVICE — TOTAL TRAY, 16FR 10ML SIL FOLEY, URN: Brand: MEDLINE

## (undated) DEVICE — SUTURE VICRYL + 3-0 L27IN ABSRB UD PS-2 L19MM 3/8 CIR PRIM VCP427H

## (undated) DEVICE — OPTIFOAM GENTLE SA, POSTOP, 4X12: Brand: MEDLINE

## (undated) DEVICE — SOLUTION IV IRRIG POUR BRL 0.9% SODIUM CHL 2F7124

## (undated) DEVICE — SOLUTION IRRIG 3000ML 0.9% SOD CHL USP UROMATIC PLAS CONT

## (undated) DEVICE — HANDPIECE SET WITH COAXIAL MULTI-ORIFICE TIP AND SUCTION TUBE: Brand: INTERPULSE